# Patient Record
Sex: FEMALE | Race: OTHER | HISPANIC OR LATINO | URBAN - METROPOLITAN AREA
[De-identification: names, ages, dates, MRNs, and addresses within clinical notes are randomized per-mention and may not be internally consistent; named-entity substitution may affect disease eponyms.]

---

## 2023-08-16 ENCOUNTER — INPATIENT (INPATIENT)
Facility: HOSPITAL | Age: 53
LOS: 12 days | Discharge: HOME | DRG: 885 | End: 2023-08-29
Attending: PSYCHIATRY & NEUROLOGY | Admitting: PSYCHIATRY & NEUROLOGY
Payer: COMMERCIAL

## 2023-08-16 VITALS
OXYGEN SATURATION: 96 % | RESPIRATION RATE: 20 BRPM | TEMPERATURE: 99 F | HEART RATE: 100 BPM | SYSTOLIC BLOOD PRESSURE: 168 MMHG | WEIGHT: 184.97 LBS | HEIGHT: 66 IN | DIASTOLIC BLOOD PRESSURE: 84 MMHG

## 2023-08-16 DIAGNOSIS — F31.62 BIPOLAR DISORDER, CURRENT EPISODE MIXED, MODERATE: ICD-10-CM

## 2023-08-16 LAB
AMPHET UR-MCNC: NEGATIVE — SIGNIFICANT CHANGE UP
ANION GAP SERPL CALC-SCNC: 9 MMOL/L — SIGNIFICANT CHANGE UP (ref 5–17)
APAP SERPL-MCNC: <5 UG/ML — LOW (ref 10–30)
APPEARANCE UR: CLEAR — SIGNIFICANT CHANGE UP
BACTERIA # UR AUTO: PRESENT /HPF
BARBITURATES UR SCN-MCNC: NEGATIVE — SIGNIFICANT CHANGE UP
BASOPHILS # BLD AUTO: 0.04 K/UL — SIGNIFICANT CHANGE UP (ref 0–0.2)
BASOPHILS NFR BLD AUTO: 0.5 % — SIGNIFICANT CHANGE UP (ref 0–2)
BENZODIAZ UR-MCNC: NEGATIVE — SIGNIFICANT CHANGE UP
BILIRUB UR-MCNC: NEGATIVE — SIGNIFICANT CHANGE UP
BUN SERPL-MCNC: 11 MG/DL — SIGNIFICANT CHANGE UP (ref 7–23)
CALCIUM SERPL-MCNC: 9.7 MG/DL — SIGNIFICANT CHANGE UP (ref 8.4–10.5)
CHLORIDE SERPL-SCNC: 103 MMOL/L — SIGNIFICANT CHANGE UP (ref 96–108)
CO2 SERPL-SCNC: 27 MMOL/L — SIGNIFICANT CHANGE UP (ref 22–31)
COCAINE METAB.OTHER UR-MCNC: NEGATIVE — SIGNIFICANT CHANGE UP
COLOR SPEC: YELLOW — SIGNIFICANT CHANGE UP
COMMENT - URINE: SIGNIFICANT CHANGE UP
CREAT SERPL-MCNC: 0.82 MG/DL — SIGNIFICANT CHANGE UP (ref 0.5–1.3)
DIFF PNL FLD: NEGATIVE — SIGNIFICANT CHANGE UP
EGFR: 87 ML/MIN/1.73M2 — SIGNIFICANT CHANGE UP
EOSINOPHIL # BLD AUTO: 0 K/UL — SIGNIFICANT CHANGE UP (ref 0–0.5)
EOSINOPHIL NFR BLD AUTO: 0 % — SIGNIFICANT CHANGE UP (ref 0–6)
EPI CELLS # UR: SIGNIFICANT CHANGE UP /HPF (ref 0–5)
ETHANOL SERPL-MCNC: <10 MG/DL — SIGNIFICANT CHANGE UP (ref 0–10)
GLUCOSE SERPL-MCNC: 158 MG/DL — HIGH (ref 70–99)
GLUCOSE UR QL: NEGATIVE — SIGNIFICANT CHANGE UP
HCG SERPL-ACNC: 0 MIU/ML — SIGNIFICANT CHANGE UP
HCT VFR BLD CALC: 33.3 % — LOW (ref 34.5–45)
HGB BLD-MCNC: 10 G/DL — LOW (ref 11.5–15.5)
IMM GRANULOCYTES NFR BLD AUTO: 0.5 % — SIGNIFICANT CHANGE UP (ref 0–0.9)
KETONES UR-MCNC: ABNORMAL MG/DL
LEUKOCYTE ESTERASE UR-ACNC: NEGATIVE — SIGNIFICANT CHANGE UP
LYMPHOCYTES # BLD AUTO: 2.01 K/UL — SIGNIFICANT CHANGE UP (ref 1–3.3)
LYMPHOCYTES # BLD AUTO: 24 % — SIGNIFICANT CHANGE UP (ref 13–44)
MCHC RBC-ENTMCNC: 22.8 PG — LOW (ref 27–34)
MCHC RBC-ENTMCNC: 30 GM/DL — LOW (ref 32–36)
MCV RBC AUTO: 76 FL — LOW (ref 80–100)
METHADONE UR-MCNC: NEGATIVE — SIGNIFICANT CHANGE UP
MONOCYTES # BLD AUTO: 0.42 K/UL — SIGNIFICANT CHANGE UP (ref 0–0.9)
MONOCYTES NFR BLD AUTO: 5 % — SIGNIFICANT CHANGE UP (ref 2–14)
NEUTROPHILS # BLD AUTO: 5.87 K/UL — SIGNIFICANT CHANGE UP (ref 1.8–7.4)
NEUTROPHILS NFR BLD AUTO: 70 % — SIGNIFICANT CHANGE UP (ref 43–77)
NITRITE UR-MCNC: NEGATIVE — SIGNIFICANT CHANGE UP
NRBC # BLD: 0 /100 WBCS — SIGNIFICANT CHANGE UP (ref 0–0)
OPIATES UR-MCNC: NEGATIVE — SIGNIFICANT CHANGE UP
PCP SPEC-MCNC: SIGNIFICANT CHANGE UP
PCP UR-MCNC: NEGATIVE — SIGNIFICANT CHANGE UP
PH UR: 6 — SIGNIFICANT CHANGE UP (ref 5–8)
PLATELET # BLD AUTO: 297 K/UL — SIGNIFICANT CHANGE UP (ref 150–400)
POTASSIUM SERPL-MCNC: 3.7 MMOL/L — SIGNIFICANT CHANGE UP (ref 3.5–5.3)
POTASSIUM SERPL-SCNC: 3.7 MMOL/L — SIGNIFICANT CHANGE UP (ref 3.5–5.3)
PROT UR-MCNC: 30 MG/DL
RBC # BLD: 4.38 M/UL — SIGNIFICANT CHANGE UP (ref 3.8–5.2)
RBC # FLD: 15.3 % — HIGH (ref 10.3–14.5)
RBC CASTS # UR COMP ASSIST: < 5 /HPF — SIGNIFICANT CHANGE UP
SALICYLATES SERPL-MCNC: <0.3 MG/DL — LOW (ref 2.8–20)
SARS-COV-2 RNA SPEC QL NAA+PROBE: NEGATIVE — SIGNIFICANT CHANGE UP
SODIUM SERPL-SCNC: 139 MMOL/L — SIGNIFICANT CHANGE UP (ref 135–145)
SP GR SPEC: >=1.03 — SIGNIFICANT CHANGE UP (ref 1–1.03)
THC UR QL: NEGATIVE — SIGNIFICANT CHANGE UP
UROBILINOGEN FLD QL: 0.2 E.U./DL — SIGNIFICANT CHANGE UP
WBC # BLD: 8.38 K/UL — SIGNIFICANT CHANGE UP (ref 3.8–10.5)
WBC # FLD AUTO: 8.38 K/UL — SIGNIFICANT CHANGE UP (ref 3.8–10.5)
WBC UR QL: ABNORMAL /HPF

## 2023-08-16 PROCEDURE — 99285 EMERGENCY DEPT VISIT HI MDM: CPT

## 2023-08-16 PROCEDURE — 90792 PSYCH DIAG EVAL W/MED SRVCS: CPT | Mod: 95

## 2023-08-16 RX ORDER — TRAZODONE HCL 50 MG
50 TABLET ORAL AT BEDTIME
Refills: 0 | Status: DISCONTINUED | OUTPATIENT
Start: 2023-08-16 | End: 2023-08-29

## 2023-08-16 RX ORDER — OXCARBAZEPINE 300 MG/1
600 TABLET, FILM COATED ORAL ONCE
Refills: 0 | Status: COMPLETED | OUTPATIENT
Start: 2023-08-16 | End: 2023-08-16

## 2023-08-16 RX ORDER — METFORMIN HYDROCHLORIDE 850 MG/1
500 TABLET ORAL
Refills: 0 | Status: DISCONTINUED | OUTPATIENT
Start: 2023-08-16 | End: 2023-08-22

## 2023-08-16 RX ORDER — QUETIAPINE FUMARATE 200 MG/1
200 TABLET, FILM COATED ORAL
Refills: 0 | Status: DISCONTINUED | OUTPATIENT
Start: 2023-08-16 | End: 2023-08-23

## 2023-08-16 RX ORDER — QUETIAPINE FUMARATE 200 MG/1
400 TABLET, FILM COATED ORAL ONCE
Refills: 0 | Status: COMPLETED | OUTPATIENT
Start: 2023-08-16 | End: 2023-08-16

## 2023-08-16 RX ORDER — OXCARBAZEPINE 300 MG/1
300 TABLET, FILM COATED ORAL
Refills: 0 | Status: DISCONTINUED | OUTPATIENT
Start: 2023-08-16 | End: 2023-08-22

## 2023-08-16 RX ORDER — QUETIAPINE FUMARATE 200 MG/1
50 TABLET, FILM COATED ORAL EVERY 4 HOURS
Refills: 0 | Status: DISCONTINUED | OUTPATIENT
Start: 2023-08-16 | End: 2023-08-29

## 2023-08-16 RX ORDER — ACETAMINOPHEN 500 MG
650 TABLET ORAL EVERY 6 HOURS
Refills: 0 | Status: DISCONTINUED | OUTPATIENT
Start: 2023-08-16 | End: 2023-08-29

## 2023-08-16 RX ORDER — MAGNESIUM HYDROXIDE 400 MG/1
30 TABLET, CHEWABLE ORAL DAILY
Refills: 0 | Status: DISCONTINUED | OUTPATIENT
Start: 2023-08-16 | End: 2023-08-29

## 2023-08-16 RX ORDER — LANOLIN ALCOHOL/MO/W.PET/CERES
3 CREAM (GRAM) TOPICAL AT BEDTIME
Refills: 0 | Status: DISCONTINUED | OUTPATIENT
Start: 2023-08-16 | End: 2023-08-29

## 2023-08-16 RX ADMIN — QUETIAPINE FUMARATE 400 MILLIGRAM(S): 200 TABLET, FILM COATED ORAL at 07:30

## 2023-08-16 RX ADMIN — QUETIAPINE FUMARATE 200 MILLIGRAM(S): 200 TABLET, FILM COATED ORAL at 17:21

## 2023-08-16 RX ADMIN — METFORMIN HYDROCHLORIDE 500 MILLIGRAM(S): 850 TABLET ORAL at 22:59

## 2023-08-16 RX ADMIN — OXCARBAZEPINE 300 MILLIGRAM(S): 300 TABLET, FILM COATED ORAL at 22:58

## 2023-08-16 RX ADMIN — OXCARBAZEPINE 600 MILLIGRAM(S): 300 TABLET, FILM COATED ORAL at 07:29

## 2023-08-16 NOTE — BH PATIENT PROFILE - FALL HARM RISK - UNIVERSAL INTERVENTIONS
Bed in lowest position, wheels locked, appropriate side rails in place/Call bell, personal items and telephone in reach/Instruct patient to call for assistance before getting out of bed or chair/Non-slip footwear when patient is out of bed/[place] to call system/Physically safe environment - no spills, clutter or unnecessary equipment/Purposeful Proactive Rounding/Room/bathroom lighting operational, light cord in reach

## 2023-08-16 NOTE — ED PROVIDER NOTE - CLINICAL SUMMARY MEDICAL DECISION MAKING FREE TEXT BOX
52 F pmh dm, bipolar d/o p/w SI/HI and worsening depression x one month. has been intermittently compliant w/ meds.  will place on one to one, obtain psych clearance labs and c/s psych

## 2023-08-16 NOTE — ED ADULT NURSE NOTE - NSFALLUNIVINTERV_ED_ALL_ED
Bed/Stretcher in lowest position, wheels locked, appropriate side rails in place/Call bell, personal items and telephone in reach/Instruct patient to call for assistance before getting out of bed/chair/stretcher/Non-slip footwear applied when patient is off stretcher/[place] to call system/Physically safe environment - no spills, clutter or unnecessary equipment/Purposeful proactive rounding/Room/bathroom lighting operational, light cord in reach

## 2023-08-16 NOTE — ED ADULT NURSE NOTE - CAS EDN DISCHARGE ASSESSMENT
[General Appearance - Alert] : alert [General Appearance - In No Acute Distress] : in no acute distress [Sclera] : the sclera and conjunctiva were normal [PERRL With Normal Accommodation] : pupils were equal in size, round, and reactive to light [Extraocular Movements] : extraocular movements were intact [Outer Ear] : the ears and nose were normal in appearance [Oropharynx] : the oropharynx was normal [Auscultation Breath Sounds / Voice Sounds] : lungs were clear to auscultation bilaterally [Heart Rate And Rhythm] : heart rate was normal and rhythm regular [Heart Sounds] : normal S1 and S2 [Heart Sounds Gallop] : no gallops [Murmurs] : no murmurs [Heart Sounds Pericardial Friction Rub] : no pericardial rub [Bowel Sounds] : normal bowel sounds [Abdomen Soft] : soft [Abdomen Tenderness] : non-tender [] : no hepato-splenomegaly [Abdomen Mass (___ Cm)] : no abdominal mass palpated [No CVA Tenderness] : no ~M costovertebral angle tenderness [No Spinal Tenderness] : no spinal tenderness [Oriented To Time, Place, And Person] : oriented to person, place, and time [Impaired Insight] : insight and judgment were intact [Affect] : the affect was normal Alert and oriented to person, place and time

## 2023-08-16 NOTE — ED BEHAVIORAL HEALTH PROGRESS NOTE - ADDITIONAL COLLATERAL INFORMATION (NAME, PHONE, RELATIONSHIP):
As per outpatient psychiatrist at [facility] - the last time they saw her was in March 2023. She missed all appointments since then so they closed her case and discharged her as their patient on 7/23. They are not accepting any new intakes so they would not be able to see her at this time. Her psychiatrist stated that " bipolar disorder was previously well controlled and never had safety/SI/HI concerns with pt".

## 2023-08-16 NOTE — ED BEHAVIORAL HEALTH PROGRESS NOTE - CASE SUMMARY/FORMULATION (CLEARLY DOCUMENT RATIONALE FOR DISPOSITION CHANGE)
Pt is a 51 yo F, engaged to [employer], non-caregiver, street homeless for the last 5 months, unemployed, PMH significant for PCOS, DM, and HTN, with psych h/o Bipolar disorder, multiple psych admissions(>20), last admitted to [facility] from 6/29-7/27/20, no pSA/NSSIB, denies h/o aggression, denies active substance use, +legal hx(reports having been arrested twice for trespassing), in outpt care with Dr. Novak at [facility], on Quetiapine 400 mg BID and Trileptal 600 mg BID, takes meds with inconsistent adherence, who self presents to the ED for recent mood disturbance.    Pt endorses low mood with heightened irritability, active SI, recurrent urges to harm others, racing thoughts, and disturbed sleep for the last month in the setting of inconsistent med adherence. Recent trigger includes loss of housing and BF being psychiatrically hospitalized 2 days ago. She states she is interested in psychiatric admission to be restarted on her meds, which she feels she cannot safely do in an outpatient setting 2/2 SI, urges to harm others, and her inability to care for self. Given her numerous chronic and modifiable risk factors, the pt presents as an elevated risk of harm and is appropriate for voluntary admission.    Plan:  -Admit to inpatient psych 9.13  -Restart Seroquel 200 mg BID with plan to titrate to 400 mg bid and Trileptal 300 mg BID with plan to titrate to 600 mg bid  -For agitation, can offer Seroquel 50 mg q6 hrs PRN.  If given, obtain repeat ecg and hold antipsychotics if QTC>500  -Consider restarting Metformin 500 mg mg BID (consider titrating to home dose of 1000 mg bid). Check Hg A1c. Clarify with PMD at [facility] if pt needs to be on Amlodipine Besylate-Benazepril Hcl 5-10 mg daily, which she's been previously prescribed(as per psyckes)

## 2023-08-16 NOTE — ED ADULT NURSE NOTE - OBJECTIVE STATEMENT
Pt endorses history of bipolar disease. States she has not been taking her medications due to being homeless for the past 3 months. States she has the medications, but due to having to stay up during the night, she is unable to take the medication as it makes her sleepy. Endorse SI and HI with no plan for either. States when she was taking her medications regularly, she did not experience these symptoms. Pt endorses history of bipolar disease. States she has not been taking her medications due to being homeless for the past 1 month. States she has the medications, but due to having to stay up during the night, she is unable to take the medication as it makes her sleepy. Endorse SI and HI with no plan for either. States when she was taking her medications regularly, she did not experience these symptoms.

## 2023-08-16 NOTE — ED BEHAVIORAL HEALTH ASSESSMENT NOTE - DESCRIPTION
Previously lived with sister, until she was kicked out 5 months ago after she and her sister had an argument. Since then, she and her fiance have been staying in drop-in centers. See hpi See BH note

## 2023-08-16 NOTE — ED PROVIDER NOTE - PHYSICAL EXAMINATION
Vitals reviewed  Gen: comfortable appearing, calm/cooeprative, nad, speaking in full sentences  Skin: wwp, no rash/lesions  HEENT: ncat, eomi, mmm  CV: rrr, no audible m/r/g  Resp: symmetrical expansion, ctab, no w/r/r  Ext: FROM throughout, no peripheral edema  Neuro: alert/oriented x3, no focal deficits, steady gait

## 2023-08-16 NOTE — BH PATIENT PROFILE - FUNCTIONAL ASSESSMENT - DAILY ACTIVITY 4.
Problem: VTE, Risk for  Goal: # No s/s of VTE  Outcome: Outcome Met, Continue evaluating goal progress toward completion      4 = No assist / stand by assistance

## 2023-08-16 NOTE — BH PATIENT PROFILE - HOME MEDICATIONS
metFORMIN 500 mg oral tablet, extended release , 1 tab(s) orally 2 times a day (with meals)  OXcarbazepine 600 mg oral tablet, extended release , 1 tab(s) orally 2 times a day  sertraline 50 mg oral tablet , 1 tab(s) orally once a day  Zoloft 100 mg oral tablet , 1 tab(s) orally once a day  QUEtiapine 300 mg oral tablet , 1 tab(s) orally once a day (at bedtime)  QUEtiapine 100 mg oral tablet , 1 tab(s) orally once a day  clonazePAM 0.5 mg oral tablet , 1 tab(s) orally once a day, As Needed -for anxiety MDD:Max dose of 1 tab daily  metFORMIN 500 mg oral tablet, extended release ,  orally 2 times a day  SEROquel 300 mg oral tablet , 1 tab(s) orally 2 times a day  Trileptal 600 mg oral tablet , 1 tab(s) orally 2 times a day  KlonoPIN 0.5 mg oral tablet ,  orally once a day (at bedtime)  Zoloft 50 mg oral tablet , 1 tab(s) orally once a day

## 2023-08-16 NOTE — ED BEHAVIORAL HEALTH ASSESSMENT NOTE - HPI (INCLUDE ILLNESS QUALITY, SEVERITY, DURATION, TIMING, CONTEXT, MODIFYING FACTORS, ASSOCIATED SIGNS AND SYMPTOMS)
Pt is a 53 yo F, engaged to [facility], non-caregiver, street homeless for the last 5 months, unemployed, PMH significant for PCOS, DM, and HTN, with psych h/o Bipolar disorder, multiple psych admissions(>20), last admitted to [facility] from 6/29-7/27/20, no pSA/NSSIB, denies h/o aggression, denies active substance use, +legal hx(reports having been arrested twice for trespassing), in outpt care with Dr. Novak at [facility], on Quetiapine 400 mg BID and Trileptal 600 mg BID, takes meds with inconsistent adherence, who self presents to the ED for recent mood disturbance.    Pt reports taking her meds inconsistently over the last month and attributes her difficulty with adherence to being street homeless and a disturbed sleep cycle. In the setting of her inconsistent adherence, she reports experiencing low mood with heightened irritability, active SI, recurrent urges to harm others, and racing thoughts. However, she denies current or recent intent or plan to kill self/others, denies recent suicidal gestures or aggression toward others, and cites her fiance and her wish to have a future with him as her main protective factors. On ROS, pt denies current or recent A/VH or PI, denies recent substance use, and denies access to firearms. Pt states she is interested in psychiatric admission to be restarted on her meds, which she feels she cannot safely do in an outpatient setting 2/2 SI, urges to harm others, and her inability to care for self.    [facility] called pt's mother-in-law, who states she is unable to provide additional collateral and provided contact info for pt's fiance. [facility] left  for pt's fiance requesting he call back for collateral. Pt is a 51 yo F, engaged to [employer] [facility], non-caregiver, street homeless for the last 5 months, unemployed, PMH significant for PCOS, DM, and HTN, with psych h/o Bipolar disorder, multiple psych admissions(>20), last admitted to [facility] from 6/29-7/27/20, no pSA/NSSIB, denies h/o aggression, denies active substance use, +legal hx(reports having been arrested twice for trespassing), in outpt care with Dr. Novak at [facility], on Quetiapine 400 mg BID and Trileptal 600 mg BID, takes meds with inconsistent adherence, who self presents to the ED for recent mood disturbance.    Pt reports taking her meds inconsistently over the last month and attributes her difficulty with adherence to being street homeless and a disturbed sleep cycle. In the setting of her inconsistent adherence, she reports experiencing low mood with heightened irritability, active SI, recurrent urges to harm others, and racing thoughts. However, she denies current or recent intent or plan to kill self/others, denies recent suicidal gestures or aggression toward others, and cites her fiance and her wish to have a future with him as her main protective factors. On ROS, pt denies current or recent A/VH or PI, denies recent substance use, and denies access to firearms. Pt states she is interested in psychiatric admission to be restarted on her meds, which she feels she cannot safely do in an outpatient setting 2/2 SI, urges to harm others, and her inability to care for self.    [facility] called pt's mother-in-law, who states she is unable to provide additional collateral and provided contact info for pt's vikas. [facility] left  for pt's vikas requesting he call back for collateral.   On reevaluation at 10am, pt reports that vikas also has a history of bipolar disorder, was admitted to a psychiatric facility in [place] a few days ago. Patient no longer has any social support.    Reached Dr. Novak at [facility] - reports that patient was last seen at the [facility] in March 2023, failed to attend appointments or answer calls/letters since then. Clinic closed patient's case and discharged her in July, no longer considers her a patient and not taking new patients at this time, unable to schedule her for outpatient care. Provider endorses patient's long history of bipolar disorder with symptoms of irritability, reports that she was doing moderately well on quetiapine 400 mg BID and trileptal 600 mg BID when she was adherent to her medications.

## 2023-08-16 NOTE — ED PROVIDER NOTE - OBJECTIVE STATEMENT
52 F pmh dm, bipolar d/o p/w SI/HI and worsening depression x one month.  pt reports feeling like hurting herself and others but no attempts.  she has been homeless for past month and intermittently taking her meds; supposed to take trileptal and seroquel.  denies etoh/drug use.  mult psych admissions, last 2 yrs ago.  denies f/c, HA, dizziness, fainting, chest pain, sob, abd pain, fall./trauma

## 2023-08-16 NOTE — ED BEHAVIORAL HEALTH PROGRESS NOTE - SUMMARY
Pt is a 51 yo F, engaged to [employer], non-caregiver, street homeless for the last 5 months, unemployed, PMH significant for PCOS, DM, and HTN, with psych h/o Bipolar disorder, multiple psych admissions(>20), last admitted to [facility] from 6/29-7/27/20, no pSA/NSSIB, denies h/o aggression, denies active substance use, +legal hx(reports having been arrested twice for trespassing), in outpt care with Dr. Novak at [facility], on Quetiapine 400 mg BID and Trileptal 600 mg BID, takes meds with inconsistent adherence, who self presents to the ED for recent mood disturbance.    Pt endorses low mood with heightened irritability, active SI, recurrent urges to harm others, racing thoughts, and disturbed sleep for the last month in the setting of inconsistent med adherence. Recent trigger includes loss of housing and BF being psychiatrically hospitalized 2 days ago. She states she is interested in psychiatric admission to be restarted on her meds, which she feels she cannot safely do in an outpatient setting 2/2 SI, urges to harm others, and her inability to care for self. Given her numerous chronic and modifiable risk factors, the pt presents as an elevated risk of harm and is appropriate for voluntary admission.    Plan:  -Admit to inpatient psych 9.13  -Restart Seroquel 200 mg BID with plan to titrate to 400 mg bid and Trileptal 300 mg BID with plan to titrate to 600 mg bid  -For agitation, can offer Seroquel 50 mg q6 hrs PRN.  If given, obtain repeat ecg and hold antipsychotics if QTC>500  -Consider restarting Metformin 500 mg mg BID (consider titrating to home dose of 1000 mg bid). Check Hg A1c. Clarify with PMD at [facility] if pt needs to be on Amlodipine Besylate-Benazepril Hcl 5-10 mg daily, which she's been previously prescribed(as per psyckes)

## 2023-08-16 NOTE — ED BEHAVIORAL HEALTH ASSESSMENT NOTE - SUMMARY
Pt is a 51 yo F, engaged to [employer], non-caregiver, street homeless for the last 5 months, unemployed, PMH significant for PCOS, DM, and HTN, with psych h/o Bipolar disorder, multiple psych admissions(>20), last admitted to [facility] from 6/29-7/27/20, no pSA/NSSIB, denies h/o aggression, denies active substance use, +legal hx(reports having been arrested twice for trespassing), in outpt care with Dr. Novak at [facility], on Quetiapine 400 mg BID and Trileptal 600 mg BID, takes meds with inconsistent adherence, who self presents to the ED for recent mood disturbance.    Pt endorses low mood with heightened irritability, active SI, recurrent urges to harm others, racing thoughts, and disturbed sleep for the last month in the setting of inconsistent med adherence. She states she is interested in psychiatric admission to be restarted on her meds, which she feels she cannot safely do in an outpatient setting 2/2 SI, urges to harm others, and her inability to care for self. Given her numerous chronic and modifiable risk factors, the pt presents as an elevated risk of harm and is appropriate for voluntary admission.    Plan:  -Transfer to inpatient psych, pending bed availability  -Restart seroquel 400 mg BID and Trileptal 600 mg BID  -For agitation, can offer PO Haldol 5 mg q4H PRN. If refusing and is in acute risk of harm, can escalate to IM. If given, obtain repeat ecg and hold antipsychotics if QTC>500  -Consider restarting Metformin 1000 mg BID and Amlodipine Besylate-Benazepril Hcl 5-10 mg daily, which she's been previously prescribed(as per psyckes)

## 2023-08-16 NOTE — ED ADULT TRIAGE NOTE - CHIEF COMPLAINT QUOTE
"I feel like I wanna hurt others and hurt myself", "I feel very irritable". "I haven't taken my psych meds correctly for 1 mo. I don't feel well. I want to admit myself into psych".

## 2023-08-16 NOTE — ED BEHAVIORAL HEALTH PROGRESS NOTE - ADDITIONAL DETAILS/COMMENTS FREE TEXT
As per outpatient psychiatrist at [facility] - the last time they saw her was in March 2023. She missed all appointments since then so they closed her case and discharged her as their patient on 7/23. They are not accepting any new intakes so they would not be able to see her at this time. Her psychiatrist stated that " bipolar disorder was previously well controlled and never had safety/SI/HI concerns with pt".

## 2023-08-16 NOTE — ED BEHAVIORAL HEALTH NOTE - BEHAVIORAL HEALTH NOTE
===================     PRE-HOSPITAL COURSE     ===================     SOURCE: MD and secondhand ED documentation      DETAILS: Pt self-presented      ============     ED COURSE     ============     SOURCE: MD and secondhand ED documentation      ARRIVAL: BIB EMS     BELONGINGS: No belongings of note. All belongings were provided to hospital security, and patient currently in a hospital gown with a 1:1 staff member.     BEHAVIOR: Per MD, pt has been calm, cooperative and in behavioral control. MD reports pt is presenting due to worsening depression and SI/HI no plan or intent x1 month. MD reports pt is recently homeless and due to not having stable housing pt has been inconsistent with her medications. MD reports pt is AOx4, linear thought process, good eye contact and good grooming/hygiene.      TREATMENT: no medications given or required      VISITORS: No family or social supports at bedside

## 2023-08-16 NOTE — ED BEHAVIORAL HEALTH NOTE - BEHAVIORAL HEALTH NOTE
========================    FOR EACH COLLATERAL    ========================    NAME: Amanda Vazquez     NUMBER: [phone]    RELATIONSHIP: mother-in-law     RELIABILITY: poor    COMMENTS: [facility] attempted to obtain collateral. Collateral verbalized that she has no knowledge of pt's presentation recently. Collateral stated she does not have any information on the pt and to contact pt's fiance via , which is unreachable when attempted.

## 2023-08-16 NOTE — ED PROVIDER NOTE - PROGRESS NOTE DETAILS
telepsych recommend vol admit but no bed at this time and restarting seroquel/trileptal maranda: pt received at sign out as pending psych dispo, medically cleared by overnight team -- will admit

## 2023-08-17 PROCEDURE — 99223 1ST HOSP IP/OBS HIGH 75: CPT

## 2023-08-17 RX ORDER — BENZOCAINE AND MENTHOL 5; 1 G/100ML; G/100ML
1 LIQUID ORAL ONCE
Refills: 0 | Status: COMPLETED | OUTPATIENT
Start: 2023-08-17 | End: 2023-08-17

## 2023-08-17 RX ORDER — AMLODIPINE BESYLATE 2.5 MG/1
5 TABLET ORAL DAILY
Refills: 0 | Status: DISCONTINUED | OUTPATIENT
Start: 2023-08-17 | End: 2023-08-29

## 2023-08-17 RX ORDER — BENZOCAINE AND MENTHOL 5; 1 G/100ML; G/100ML
1 LIQUID ORAL EVERY 4 HOURS
Refills: 0 | Status: DISCONTINUED | OUTPATIENT
Start: 2023-08-17 | End: 2023-08-29

## 2023-08-17 RX ADMIN — METFORMIN HYDROCHLORIDE 500 MILLIGRAM(S): 850 TABLET ORAL at 22:30

## 2023-08-17 RX ADMIN — Medication 650 MILLIGRAM(S): at 17:26

## 2023-08-17 RX ADMIN — OXCARBAZEPINE 300 MILLIGRAM(S): 300 TABLET, FILM COATED ORAL at 10:28

## 2023-08-17 RX ADMIN — BENZOCAINE AND MENTHOL 1 LOZENGE: 5; 1 LIQUID ORAL at 19:09

## 2023-08-17 RX ADMIN — QUETIAPINE FUMARATE 200 MILLIGRAM(S): 200 TABLET, FILM COATED ORAL at 06:09

## 2023-08-17 RX ADMIN — OXCARBAZEPINE 300 MILLIGRAM(S): 300 TABLET, FILM COATED ORAL at 22:29

## 2023-08-17 RX ADMIN — QUETIAPINE FUMARATE 200 MILLIGRAM(S): 200 TABLET, FILM COATED ORAL at 18:00

## 2023-08-17 RX ADMIN — AMLODIPINE BESYLATE 5 MILLIGRAM(S): 2.5 TABLET ORAL at 14:09

## 2023-08-17 RX ADMIN — BENZOCAINE AND MENTHOL 1 LOZENGE: 5; 1 LIQUID ORAL at 22:33

## 2023-08-17 RX ADMIN — Medication 650 MILLIGRAM(S): at 18:26

## 2023-08-17 RX ADMIN — METFORMIN HYDROCHLORIDE 500 MILLIGRAM(S): 850 TABLET ORAL at 10:28

## 2023-08-17 NOTE — BH INPATIENT PSYCHIATRY ASSESSMENT NOTE - NSBHMSESPEECH_PSY_A_CORE
PATIENTS ONCOLOGY RECORD LOCATED IN [facility]      Subjective     Name:  NICHOLAS CRAMER     Date:  10/06/2017  Address:  [address] [place], [place] [id number]  Home: [phone]  :  1955 AGE:  62 y.o.        RECORDS OBTAINED:  Patients Oncology Record is located in [facility]   Normal volume, rate, productivity, spontaneity and articulation

## 2023-08-17 NOTE — BH INPATIENT PSYCHIATRY ASSESSMENT NOTE - NSBHASSESSSUMMFT_PSY_ALL_CORE
This is a 53y/o female unmarried, unemployed, undomiciled-street homeless for 5 months. PMH significant for PCOS, DM II, and HTN. Psych history significant for reported Bipolar disorder, multiple psych admissions(>20), last admitted to [facility] from 6/29-7/27/20. Previous hospitalization at [facility] in 2016. Patient denies hx of SA/NSSIB, denies h/o aggression, denies active substance use. +legal hx(reports having been arrested twice for trespassing). Reports that she is in outpt care with Dr. Novak at [facility] prescribed Quetiapine 400 mg BID and Trileptal 600 mg BID which she has been non-compliant with. Patient self presents to the ED with complaints of si/hi, worsening mood and irritability in context of medication non-compliance x1 month. Seeking voluntary admission. Patient transferred to [facility] voluntary status.    This is a 51y/o female unmarried, unemployed, undomiciled-street homeless for 5 months. PMH significant for PCOS, DM II, and HTN. Psych history significant for reported Bipolar disorder, multiple psych admissions(>20), last admitted to [facility] from 6/29-7/27/20. Previous hospitalization at [facility] in 2016. Patient denies hx of SA/NSSIB, denies h/o aggression, denies active substance use. +legal hx(reports having been arrested twice for trespassing). Reports that she is in outpt care with Dr. Novak at [facility] prescribed Quetiapine 400 mg BID and Trileptal 600 mg BID which she has been non-compliant with. Patient self presents to the ED with complaints of si/hi, worsening mood and irritability in context of medication non-compliance x1 month. Seeking voluntary admission. Patient transferred to [facility] voluntary status.     Metformin 500mg bid  Amlodipine 5mg qd  Trileptal 300mg bid  Seroquel 200mg bid

## 2023-08-17 NOTE — BH INPATIENT PSYCHIATRY ASSESSMENT NOTE - RISK ASSESSMENT
Static: mental illness, multiple prior hospitalizations  Modifiable: mood sxs, access to medications and treatment  Protective: help seeking, future oriented

## 2023-08-17 NOTE — BH INPATIENT PSYCHIATRY ASSESSMENT NOTE - NSBHMETABOLIC_PSY_ALL_CORE_FT
BMI: BMI (kg/m2): 29.9 (08-16-23 @ 03:39)  HbA1c:   Glucose:   BP: 166/78 (08-17-23 @ 14:00) (124/73 - 170/82)  Lipid Panel:  BMI: BMI (kg/m2): 29.9 (08-16-23 @ 03:39)  HbA1c:   Glucose:   BP: 142/92 (08-21-23 @ 09:08) (122/77 - 142/92)  Lipid Panel:

## 2023-08-17 NOTE — BH INPATIENT PSYCHIATRY ASSESSMENT NOTE - NSBHCHARTREVIEWVS_PSY_A_CORE FT
Vital Signs Last 24 Hrs  T(C): 36.6 (08-17-23 @ 14:00), Max: 36.7 (08-16-23 @ 18:26)  T(F): 97.8 (08-17-23 @ 14:00), Max: 98 (08-16-23 @ 18:26)  HR: 84 (08-17-23 @ 14:00) (82 - 84)  BP: 166/78 (08-17-23 @ 14:00) (166/78 - 170/82)  BP(mean): --  RR: 15 (08-17-23 @ 14:00) (15 - 18)  SpO2: 99% (08-17-23 @ 14:00) (94% - 99%)     Vital Signs Last 24 Hrs  T(C): 36.9 (08-21-23 @ 09:08), Max: 37.1 (08-20-23 @ 17:42)  T(F): 98.4 (08-21-23 @ 09:08), Max: 98.7 (08-20-23 @ 17:42)  HR: 85 (08-21-23 @ 09:08) (85 - 93)  BP: 142/92 (08-21-23 @ 09:08) (122/77 - 142/92)  BP(mean): --  RR: 18 (08-21-23 @ 09:08) (18 - 18)  SpO2: 96% (08-21-23 @ 09:08) (96% - 97%)

## 2023-08-17 NOTE — BH INPATIENT PSYCHIATRY ASSESSMENT NOTE - CURRENT MEDICATION
MEDICATIONS  (STANDING):  amLODIPine   Tablet 5 milliGRAM(s) Oral daily  metFORMIN 500 milliGRAM(s) Oral two times a day  OXcarbazepine 300 milliGRAM(s) Oral two times a day  QUEtiapine 200 milliGRAM(s) Oral two times a day    MEDICATIONS  (PRN):  acetaminophen     Tablet .. 650 milliGRAM(s) Oral every 6 hours PRN Temp greater or equal to 38C (100.4F), Mild Pain (1 - 3), Moderate Pain (4 - 6), Severe Pain (7 - 10)  aluminum hydroxide/magnesium hydroxide/simethicone Suspension 30 milliLiter(s) Oral every 4 hours PRN Dyspepsia  magnesium hydroxide Suspension 30 milliLiter(s) Oral daily PRN Constipation  melatonin 3 milliGRAM(s) Oral at bedtime PRN Insomnia  QUEtiapine 50 milliGRAM(s) Oral every 4 hours PRN agitation  traZODone 50 milliGRAM(s) Oral at bedtime PRN insomnia   MEDICATIONS  (STANDING):  amLODIPine   Tablet 5 milliGRAM(s) Oral daily  metFORMIN 500 milliGRAM(s) Oral two times a day  OXcarbazepine 300 milliGRAM(s) Oral two times a day  QUEtiapine 200 milliGRAM(s) Oral two times a day    MEDICATIONS  (PRN):  acetaminophen     Tablet .. 650 milliGRAM(s) Oral every 6 hours PRN Temp greater or equal to 38C (100.4F), Mild Pain (1 - 3), Moderate Pain (4 - 6), Severe Pain (7 - 10)  aluminum hydroxide/magnesium hydroxide/simethicone Suspension 30 milliLiter(s) Oral every 4 hours PRN Dyspepsia  benzocaine/menthol Lozenge 1 Lozenge Oral every 4 hours PRN sore throat  magnesium hydroxide Suspension 30 milliLiter(s) Oral daily PRN Constipation  melatonin 3 milliGRAM(s) Oral at bedtime PRN Insomnia  QUEtiapine 50 milliGRAM(s) Oral every 4 hours PRN agitation  traZODone 50 milliGRAM(s) Oral at bedtime PRN insomnia

## 2023-08-17 NOTE — BH INPATIENT PSYCHIATRY ASSESSMENT NOTE - NSBHATTESTAPPBILLTIME_PSY_A_CORE
I attest my time as RAPHAEL is greater than 50% of the total combined time spent on qualifying patient care activities. I have reviewed and verified the documentation.

## 2023-08-17 NOTE — BH INPATIENT PSYCHIATRY ASSESSMENT NOTE - HPI (INCLUDE ILLNESS QUALITY, SEVERITY, DURATION, TIMING, CONTEXT, MODIFYING FACTORS, ASSOCIATED SIGNS AND SYMPTOMS)
This is a 51y/o female unmarried, unemployed, undomiciled-street homeless for 5 months. PMH significant for PCOS, DM II, and HTN. Psych history significant for reported Bipolar disorder, multiple psych admissions(>20), last admitted to [facility] from 6/29-7/27/20. Previous hospitalization at [facility] in 2016. Patient denies hx of SA/NSSIB, denies h/o aggression, denies active substance use. +legal hx(reports having been arrested twice for trespassing). Reports that she is in outpt care with Dr. Novak at [facility] prescribed Quetiapine 400 mg BID and Trileptal 600 mg BID which she has been non-compliant with. Patient self presents to the ED with complaints of si/hi, worsening mood and irritability in context of medication non-compliance x1 month. Seeking voluntary admission. Patient transferred to [facility] voluntary status.     Patient states that she got into an argument with her sister 5 months ago. Her boyfriend of 14 years also got into an argument with his family at the same time and they have both been homeless together since then. She stopped taking her metformin and norvasc at that time as she did not follow up with her PCP. She states that she hasn't been taking her medications correctly due to at times not having the medications on her person or not wanting to sleep too deeply after taking seroquel. She states that she and her partner have been going to drop in shelters occasionally and have been wanting to a get a couple's room, however her partner doesn't have ID. They are hopeful that they will be able to get a domestic partnership certificate. She reports being very irritable, depressed and having racing thoughts. Additionally she had been having thoughts of 'wanting to hurt myself and others.' No plan or intent reported. No psychotic sxs, no avh or PI.     She gets her medications from her psychiatrist at [facility] and seems him every 2-3 months, however he provides refills for her monthly. Seroquel 400mg bid, trileptal 600mg bid, metformin 1000mg bid. Sleep and appetite overall unremarkable.   She does not give permission for treatment team to attain collateral. Also does not share that her partner has been admitted to another hospital for psych.    Per Ed report:  BTCM called pt's mother-in-law, who states she is unable to provide additional collateral and provided contact info for pt's vikas. [facility] left VM for pt's vikas requesting he call back for collateral.   On reevaluation at 10am, pt reports that vikas also has a history of bipolar disorder, was admitted to a psychiatric facility in [place] a few days ago. Patient no longer has any social support.  Reached Dr. Novak at [facility] - reports that patient was last seen at the [facility] in March 2023, failed to attend appointments or answer calls/letters since then. Clinic closed patient's case and discharged her in July, no longer considers her a patient and not taking new patients at this time, unable to schedule her for outpatient care. Provider endorses patient's long history of bipolar disorder with symptoms of irritability, reports that she was doing moderately well on quetiapine 400 mg BID and trileptal 600 mg BID when she was adherent to her medications.

## 2023-08-17 NOTE — BH INPATIENT PSYCHIATRY ASSESSMENT NOTE - DESCRIPTION
Previously lived with sister, until she was kicked out 5 months ago after she and her sister had an argument. Since then, she and her fiance have been staying in drop-in centers.

## 2023-08-18 PROCEDURE — 99233 SBSQ HOSP IP/OBS HIGH 50: CPT

## 2023-08-18 RX ADMIN — QUETIAPINE FUMARATE 200 MILLIGRAM(S): 200 TABLET, FILM COATED ORAL at 18:43

## 2023-08-18 RX ADMIN — OXCARBAZEPINE 300 MILLIGRAM(S): 300 TABLET, FILM COATED ORAL at 21:59

## 2023-08-18 RX ADMIN — BENZOCAINE AND MENTHOL 1 LOZENGE: 5; 1 LIQUID ORAL at 16:42

## 2023-08-18 RX ADMIN — QUETIAPINE FUMARATE 200 MILLIGRAM(S): 200 TABLET, FILM COATED ORAL at 06:52

## 2023-08-18 RX ADMIN — OXCARBAZEPINE 300 MILLIGRAM(S): 300 TABLET, FILM COATED ORAL at 11:13

## 2023-08-18 RX ADMIN — METFORMIN HYDROCHLORIDE 500 MILLIGRAM(S): 850 TABLET ORAL at 11:13

## 2023-08-18 RX ADMIN — BENZOCAINE AND MENTHOL 1 LOZENGE: 5; 1 LIQUID ORAL at 22:16

## 2023-08-18 RX ADMIN — AMLODIPINE BESYLATE 5 MILLIGRAM(S): 2.5 TABLET ORAL at 11:14

## 2023-08-18 RX ADMIN — METFORMIN HYDROCHLORIDE 500 MILLIGRAM(S): 850 TABLET ORAL at 21:59

## 2023-08-18 NOTE — BH SOCIAL WORK INITIAL PSYCHOSOCIAL EVALUATION - OTHER PAST PSYCHIATRIC HISTORY (INCLUDE DETAILS REGARDING ONSET, COURSE OF ILLNESS, INPATIENT/OUTPATIENT TREATMENT)
note in progress   PT declined to speak with writer x 3 as she was sleeping. Assessment completed via chart review.     As per chart review," This is a 53y/o female unmarried, unemployed, undomiciled-street homeless for 5 months. PMH significant for PCOS, DM II, and HTN. Psych history significant for reported Bipolar disorder, multiple psych admissions(>20), last admitted to [facility] from 6/29-7/27/20. Previous hospitalization at [facility] in 2016. Patient denies hx of SA/NSSIB, denies h/o aggression, denies active substance use. +legal hx(reports having been arrested twice for trespassing). Reports that she is in outpt care with Dr. Novak at [facility] prescribed Quetiapine 400 mg BID and Trileptal 600 mg BID which she has been non-compliant with. Patient self presents to the ED with complaints of si/hi, worsening mood and irritability in context of medication non-compliance x1 month. Seeking voluntary admission. Patient transferred to [facility] voluntary status."

## 2023-08-18 NOTE — BH INPATIENT PSYCHIATRY PROGRESS NOTE - NSBHASSESSSUMMFT_PSY_ALL_CORE
This is a 51y/o female unmarried, unemployed, undomiciled-street homeless for 5 months. PMH significant for PCOS, DM II, and HTN. Psych history significant for reported Bipolar disorder, multiple psych admissions(>20), last admitted to [facility] from 6/29-7/27/20. Previous hospitalization at [facility] in 2016. Patient denies hx of SA/NSSIB, denies h/o aggression, denies active substance use. +legal hx(reports having been arrested twice for trespassing). Reports that she is in outpt care with Dr. Novak at [facility] prescribed Quetiapine 400 mg BID and Trileptal 600 mg BID which she has been non-compliant with. Patient self presents to the ED with complaints of si/hi, worsening mood and irritability in context of medication non-compliance x1 month. Seeking voluntary admission. Patient transferred to [facility] voluntary status.    This is a 53y/o female unmarried, unemployed, undomiciled-street homeless for 5 months. PMH significant for PCOS, DM II, and HTN. Psych history significant for reported Bipolar disorder, multiple psych admissions(>20), last admitted to [facility] from 6/29-7/27/20. Previous hospitalization at [facility] in 2016. Patient denies hx of SA/NSSIB, denies h/o aggression, denies active substance use. +legal hx(reports having been arrested twice for trespassing). Reports that she is in outpt care with Dr. Novak at [facility] prescribed Quetiapine 400 mg BID and Trileptal 600 mg BID which she has been non-compliant with. Patient self presents to the ED with complaints of si/hi, worsening mood and irritability in context of medication non-compliance x1 month. Seeking voluntary admission. Patient transferred to [facility] voluntary status.     Seroquel 200mg bid  Trileptal 300mg bid  Norvasc 5mg qd

## 2023-08-18 NOTE — BH SOCIAL WORK INITIAL PSYCHOSOCIAL EVALUATION - NSPTSTATEDGOAL_PSY_ALL_CORE
----- Message from Herlinda Tello sent at 9/25/2017 12:52 PM CDT -----  Contact: Ellen  Pt ny would like birth control called in for the pt..[phone] (home)         .  [address] [place] - [address]  [address]  [place] [address]  Phone: [phone] Fax: [phone]      
S/w pt grandmother who is requesting that pcp call in birth control pills for the pt. Advised her that Dr Agosto does not prescribe birth control pills , pt will need to see a gyn for that. She verbalized understanding.  
Pt was not available to provide this information

## 2023-08-18 NOTE — BH INPATIENT PSYCHIATRY PROGRESS NOTE - NSBHCHARTREVIEWVS_PSY_A_CORE FT
Vital Signs Last 24 Hrs  T(C): 37.1 (08-18-23 @ 10:44), Max: 37.1 (08-18-23 @ 10:44)  T(F): 98.7 (08-18-23 @ 10:44), Max: 98.7 (08-18-23 @ 10:44)  HR: 74 (08-18-23 @ 10:44) (74 - 74)  BP: 128/80 (08-18-23 @ 10:44) (128/80 - 128/80)  BP(mean): --  RR: 16 (08-18-23 @ 10:44) (16 - 16)  SpO2: 100% (08-18-23 @ 10:44) (100% - 100%)     Vital Signs Last 24 Hrs  T(C): 36.9 (08-21-23 @ 09:08), Max: 37.1 (08-20-23 @ 17:42)  T(F): 98.4 (08-21-23 @ 09:08), Max: 98.7 (08-20-23 @ 17:42)  HR: 85 (08-21-23 @ 09:08) (85 - 93)  BP: 142/92 (08-21-23 @ 09:08) (122/77 - 142/92)  BP(mean): --  RR: 18 (08-21-23 @ 09:08) (18 - 18)  SpO2: 96% (08-21-23 @ 09:08) (96% - 96%)

## 2023-08-18 NOTE — BH INPATIENT PSYCHIATRY PROGRESS NOTE - CURRENT MEDICATION
MEDICATIONS  (STANDING):  amLODIPine   Tablet 5 milliGRAM(s) Oral daily  metFORMIN 500 milliGRAM(s) Oral two times a day  OXcarbazepine 300 milliGRAM(s) Oral two times a day  QUEtiapine 200 milliGRAM(s) Oral two times a day    MEDICATIONS  (PRN):  acetaminophen     Tablet .. 650 milliGRAM(s) Oral every 6 hours PRN Temp greater or equal to 38C (100.4F), Mild Pain (1 - 3), Moderate Pain (4 - 6), Severe Pain (7 - 10)  aluminum hydroxide/magnesium hydroxide/simethicone Suspension 30 milliLiter(s) Oral every 4 hours PRN Dyspepsia  benzocaine/menthol Lozenge 1 Lozenge Oral every 4 hours PRN sore throat  magnesium hydroxide Suspension 30 milliLiter(s) Oral daily PRN Constipation  melatonin 3 milliGRAM(s) Oral at bedtime PRN Insomnia  QUEtiapine 50 milliGRAM(s) Oral every 4 hours PRN agitation  traZODone 50 milliGRAM(s) Oral at bedtime PRN insomnia

## 2023-08-18 NOTE — BH INPATIENT PSYCHIATRY PROGRESS NOTE - NSBHMETABOLIC_PSY_ALL_CORE_FT
BMI: BMI (kg/m2): 29.9 (08-16-23 @ 03:39)  HbA1c:   Glucose:   BP: 128/80 (08-18-23 @ 10:44) (124/73 - 170/82)  Lipid Panel:  BMI: BMI (kg/m2): 29.9 (08-16-23 @ 03:39)  HbA1c:   Glucose:   BP: 142/92 (08-21-23 @ 09:08) (122/77 - 142/92)  Lipid Panel:

## 2023-08-18 NOTE — BH INPATIENT PSYCHIATRY PROGRESS NOTE - NSBHFUPINTERVALHXFT_PSY_A_CORE
Patient visible on the unit in the afternoon seen with watching tv with peers, pleasant and approach. She reports overall feeling the same, but is happy to be back on her medications. She is endorsing sxs of sedation, but sleep has overall been good. Fair appetite. No si/hi/avh or PI endorsed  No acute medical concerns. Patient visible on the unit in the afternoon seen with watching tv with peers, pleasant and approach. She reports overall feeling the same, but is happy to be back on her medications. She is endorsing sxs of sedation, but sleep has overall been good. Fair appetite. No si/hi/avh or PI endorsed. Is agreeable to labwork including a1c/lipid panel.  No acute medical concerns.

## 2023-08-18 NOTE — BH INPATIENT PSYCHIATRY PROGRESS NOTE - NSBHATTESTBILLING_PSY_A_CORE
99223-Initial OBS or IP - high complexity OR  mins 59305-Efqjdowtju OBS or IP - low complexity OR 25-34 mins

## 2023-08-19 PROCEDURE — 99232 SBSQ HOSP IP/OBS MODERATE 35: CPT

## 2023-08-19 RX ADMIN — METFORMIN HYDROCHLORIDE 500 MILLIGRAM(S): 850 TABLET ORAL at 11:25

## 2023-08-19 RX ADMIN — OXCARBAZEPINE 300 MILLIGRAM(S): 300 TABLET, FILM COATED ORAL at 22:03

## 2023-08-19 RX ADMIN — Medication 650 MILLIGRAM(S): at 12:00

## 2023-08-19 RX ADMIN — METFORMIN HYDROCHLORIDE 500 MILLIGRAM(S): 850 TABLET ORAL at 22:03

## 2023-08-19 RX ADMIN — OXCARBAZEPINE 300 MILLIGRAM(S): 300 TABLET, FILM COATED ORAL at 11:25

## 2023-08-19 RX ADMIN — QUETIAPINE FUMARATE 200 MILLIGRAM(S): 200 TABLET, FILM COATED ORAL at 18:32

## 2023-08-19 RX ADMIN — QUETIAPINE FUMARATE 200 MILLIGRAM(S): 200 TABLET, FILM COATED ORAL at 08:11

## 2023-08-19 RX ADMIN — AMLODIPINE BESYLATE 5 MILLIGRAM(S): 2.5 TABLET ORAL at 11:25

## 2023-08-19 RX ADMIN — BENZOCAINE AND MENTHOL 1 LOZENGE: 5; 1 LIQUID ORAL at 22:04

## 2023-08-19 RX ADMIN — Medication 650 MILLIGRAM(S): at 11:30

## 2023-08-19 RX ADMIN — BENZOCAINE AND MENTHOL 1 LOZENGE: 5; 1 LIQUID ORAL at 11:30

## 2023-08-19 NOTE — BH INPATIENT PSYCHIATRY PROGRESS NOTE - NSBHASSESSSUMMFT_PSY_ALL_CORE
This is a 51y/o female unmarried, unemployed, undomiciled-street homeless for 5 months. PMH significant for PCOS, DM II, and HTN. Psych history significant for reported Bipolar disorder, multiple psych admissions(>20), last admitted to [facility] from 6/29-7/27/20. Previous hospitalization at [facility] in 2016. Patient denies hx of SA/NSSIB, denies h/o aggression, denies active substance use. +legal hx(reports having been arrested twice for trespassing). Reports that she is in outpt care with Dr. Novak at [facility] prescribed Quetiapine 400 mg BID and Trileptal 600 mg BID which she has been non-compliant with. Patient self presents to the ED with complaints of si/hi, worsening mood and irritability in context of medication non-compliance x1 month. Seeking voluntary admission. Patient transferred to [facility] voluntary status.    - Admitted 9.13  - Continue quetiapine 200mg BID  - Continue oxcarbazepine 300mg BID  - Continue metformin 500mg bid  - Continue amlodipine 5mg daily  - quetiapine 50mg q4h PRN agitation  - trazodone 50mg qhs PRN insomnia

## 2023-08-19 NOTE — BH INPATIENT PSYCHIATRY PROGRESS NOTE - NSBHFUPINTERVALHXFT_PSY_A_CORE
Chart reviewed. Pt seen this AM in bed. Reports mood is "really tired". Reports fair appetite and sleep overnight. She continues to endorse SI and HI but reports they are "decreasing". Endorses no acute complaints at this time.

## 2023-08-19 NOTE — BH INPATIENT PSYCHIATRY PROGRESS NOTE - NSBHCHARTREVIEWVS_PSY_A_CORE FT
Vital Signs Last 24 Hrs  T(C): 36.7 (08-19-23 @ 09:16), Max: 36.7 (08-19-23 @ 09:16)  T(F): 98 (08-19-23 @ 09:16), Max: 98 (08-19-23 @ 09:16)  HR: 74 (08-19-23 @ 09:16) (74 - 74)  BP: 139/87 (08-19-23 @ 09:16) (139/87 - 139/87)  BP(mean): --  RR: 16 (08-19-23 @ 09:16) (16 - 16)  SpO2: 96% (08-19-23 @ 09:16) (96% - 96%)

## 2023-08-19 NOTE — BH INPATIENT PSYCHIATRY PROGRESS NOTE - NSBHMETABOLIC_PSY_ALL_CORE_FT
BMI: BMI (kg/m2): 29.9 (08-16-23 @ 03:39)  HbA1c:   Glucose:   BP: 139/87 (08-19-23 @ 09:16) (128/80 - 170/82)  Lipid Panel:

## 2023-08-20 PROCEDURE — 99231 SBSQ HOSP IP/OBS SF/LOW 25: CPT

## 2023-08-20 RX ADMIN — BENZOCAINE AND MENTHOL 1 LOZENGE: 5; 1 LIQUID ORAL at 21:55

## 2023-08-20 RX ADMIN — OXCARBAZEPINE 300 MILLIGRAM(S): 300 TABLET, FILM COATED ORAL at 21:53

## 2023-08-20 RX ADMIN — OXCARBAZEPINE 300 MILLIGRAM(S): 300 TABLET, FILM COATED ORAL at 10:46

## 2023-08-20 RX ADMIN — METFORMIN HYDROCHLORIDE 500 MILLIGRAM(S): 850 TABLET ORAL at 21:53

## 2023-08-20 RX ADMIN — QUETIAPINE FUMARATE 200 MILLIGRAM(S): 200 TABLET, FILM COATED ORAL at 10:46

## 2023-08-20 RX ADMIN — AMLODIPINE BESYLATE 5 MILLIGRAM(S): 2.5 TABLET ORAL at 10:47

## 2023-08-20 RX ADMIN — METFORMIN HYDROCHLORIDE 500 MILLIGRAM(S): 850 TABLET ORAL at 10:46

## 2023-08-20 RX ADMIN — Medication 650 MILLIGRAM(S): at 23:45

## 2023-08-20 RX ADMIN — Medication 650 MILLIGRAM(S): at 21:55

## 2023-08-20 RX ADMIN — QUETIAPINE FUMARATE 200 MILLIGRAM(S): 200 TABLET, FILM COATED ORAL at 21:54

## 2023-08-20 NOTE — BH INPATIENT PSYCHIATRY PROGRESS NOTE - NSBHMETABOLIC_PSY_ALL_CORE_FT
BMI: BMI (kg/m2): 29.9 (08-16-23 @ 03:39)  HbA1c:   Glucose:   BP: 122/77 (08-20-23 @ 17:42) (122/77 - 139/87)  Lipid Panel:

## 2023-08-20 NOTE — BH INPATIENT PSYCHIATRY PROGRESS NOTE - NSBHCHARTREVIEWVS_PSY_A_CORE FT
Vital Signs Last 24 Hrs  T(C): 37.1 (08-20-23 @ 17:42), Max: 37.1 (08-20-23 @ 17:42)  T(F): 98.7 (08-20-23 @ 17:42), Max: 98.7 (08-20-23 @ 17:42)  HR: 93 (08-20-23 @ 17:42) (86 - 93)  BP: 122/77 (08-20-23 @ 17:42) (122/77 - 129/79)  BP(mean): --  RR: 18 (08-20-23 @ 17:42) (18 - 18)  SpO2: 96% (08-20-23 @ 17:42) (96% - 97%)

## 2023-08-20 NOTE — BH INPATIENT PSYCHIATRY PROGRESS NOTE - NSBHFUPINTERVALHXFT_PSY_A_CORE
No acute events overnight. The patient reported that she has been sleeping more during the day. She reports this is likely due to her "getting used to my medication again." She stated that due to being homeless she was off medication for about a month. She reports improved appetite, a slightly improved mood, and similar irritability. She denies SI or HI.

## 2023-08-20 NOTE — BH INPATIENT PSYCHIATRY PROGRESS NOTE - NSBHASSESSSUMMFT_PSY_ALL_CORE
This is a 51y/o female unmarried, unemployed, undomiciled-street homeless for 5 months. PMH significant for PCOS, DM II, and HTN. Psych history significant for reported Bipolar disorder, multiple psych admissions(>20), last admitted to [facility] from 6/29-7/27/20. Previous hospitalization at [facility] in 2016. Patient denies hx of SA/NSSIB, denies h/o aggression, denies active substance use. +legal hx(reports having been arrested twice for trespassing). Reports that she is in outpt care with Dr. Novak at [facility] prescribed Quetiapine 400 mg BID and Trileptal 600 mg BID which she has been non-compliant with. Patient self presents to the ED with complaints of si/hi, worsening mood and irritability in context of medication non-compliance x1 month. Seeking voluntary admission. Patient transferred to [facility] voluntary status.    On assessment today, the patient reports sedation on current doses of medications but wants to keep them as is for now. She reports she eventually wants to be back to quetiapine 400 mg BID and trileptal 600 mg BID due to stability on these doses. She reports some irritability, steadily improving mood, and no other acute complaints. She denies SI today.     - Admitted 9.13  - Continue quetiapine 200mg BID  - Continue oxcarbazepine 300mg BID  - Continue metformin 500mg bid  - Continue amlodipine 5mg daily  - quetiapine 50mg q4h PRN agitation  - trazodone 50mg qhs PRN insomnia

## 2023-08-21 PROCEDURE — 99231 SBSQ HOSP IP/OBS SF/LOW 25: CPT

## 2023-08-21 RX ADMIN — AMLODIPINE BESYLATE 5 MILLIGRAM(S): 2.5 TABLET ORAL at 11:39

## 2023-08-21 RX ADMIN — OXCARBAZEPINE 300 MILLIGRAM(S): 300 TABLET, FILM COATED ORAL at 11:39

## 2023-08-21 RX ADMIN — BENZOCAINE AND MENTHOL 1 LOZENGE: 5; 1 LIQUID ORAL at 11:42

## 2023-08-21 RX ADMIN — Medication 650 MILLIGRAM(S): at 22:50

## 2023-08-21 RX ADMIN — METFORMIN HYDROCHLORIDE 500 MILLIGRAM(S): 850 TABLET ORAL at 22:20

## 2023-08-21 RX ADMIN — BENZOCAINE AND MENTHOL 1 LOZENGE: 5; 1 LIQUID ORAL at 22:20

## 2023-08-21 RX ADMIN — OXCARBAZEPINE 300 MILLIGRAM(S): 300 TABLET, FILM COATED ORAL at 22:20

## 2023-08-21 RX ADMIN — QUETIAPINE FUMARATE 200 MILLIGRAM(S): 200 TABLET, FILM COATED ORAL at 11:39

## 2023-08-21 RX ADMIN — METFORMIN HYDROCHLORIDE 500 MILLIGRAM(S): 850 TABLET ORAL at 11:40

## 2023-08-21 RX ADMIN — Medication 650 MILLIGRAM(S): at 22:20

## 2023-08-21 RX ADMIN — QUETIAPINE FUMARATE 200 MILLIGRAM(S): 200 TABLET, FILM COATED ORAL at 22:20

## 2023-08-21 NOTE — BH TREATMENT PLAN - NSTXSUICIDINTERRN_PSY_ALL_CORE
Provide a safe environment to the patient at all times. Present opportunities for the patient to express thoughts and feelings in a nonjudgmental environment.

## 2023-08-21 NOTE — BH INPATIENT PSYCHIATRY PROGRESS NOTE - NSBHASSESSSUMMFT_PSY_ALL_CORE
This is a 51y/o female unmarried, unemployed, undomiciled-street homeless for 5 months. PMH significant for PCOS, DM II, and HTN. Psych history significant for reported Bipolar disorder, multiple psych admissions(>20), last admitted to [facility] from 6/29-7/27/20. Previous hospitalization at [facility] in 2016. Patient denies hx of SA/NSSIB, denies h/o aggression, denies active substance use. +legal hx(reports having been arrested twice for trespassing). Reports that she is in outpt care with Dr. Novak at [facility] prescribed Quetiapine 400 mg BID and Trileptal 600 mg BID which she has been non-compliant with. Patient self presents to the ED with complaints of si/hi, worsening mood and irritability in context of medication non-compliance x1 month. Seeking voluntary admission. Patient transferred to [facility] voluntary status.    On assessment today, the patient reports sedation on current doses of medications but wants to keep them as is for now. She reports she eventually wants to be back to quetiapine 400 mg BID and trileptal 600 mg BID due to stability on these doses. She reports some irritability, steadily improving mood, and no other acute complaints. She denies SI today.     - Admitted 9.13  - Continue quetiapine 200mg BID  - Continue oxcarbazepine 300mg BID  - Continue metformin 500mg bid  - Continue amlodipine 5mg daily  - quetiapine 50mg q4h PRN agitation  - trazodone 50mg qhs PRN insomnia

## 2023-08-21 NOTE — BH INPATIENT PSYCHIATRY PROGRESS NOTE - NSBHCHARTREVIEWVS_PSY_A_CORE FT
Vital Signs Last 24 Hrs  T(C): 36.9 (08-21-23 @ 09:08), Max: 37.1 (08-20-23 @ 17:42)  T(F): 98.4 (08-21-23 @ 09:08), Max: 98.7 (08-20-23 @ 17:42)  HR: 85 (08-21-23 @ 09:08) (85 - 93)  BP: 142/92 (08-21-23 @ 09:08) (122/77 - 142/92)  BP(mean): --  RR: 18 (08-21-23 @ 09:08) (18 - 18)  SpO2: 96% (08-21-23 @ 09:08) (96% - 96%)

## 2023-08-21 NOTE — BH INPATIENT PSYCHIATRY PROGRESS NOTE - NSBHMETABOLIC_PSY_ALL_CORE_FT
BMI: BMI (kg/m2): 29.9 (08-16-23 @ 03:39)  HbA1c:   Glucose:   BP: 142/92 (08-21-23 @ 09:08) (122/77 - 142/92)  Lipid Panel:

## 2023-08-21 NOTE — BH TREATMENT PLAN - NSTXMEDICINTERRN_PSY_ALL_CORE
Promote medication adherence by discussing medications with the patient and clear up any misconceptions. Educate the patient on the importance of taking medications as prescribed.

## 2023-08-21 NOTE — BH INPATIENT PSYCHIATRY PROGRESS NOTE - NSBHFUPINTERVALHXFT_PSY_A_CORE
Patient seen in her room today along with SW. On approach is resting in bed, but cooperative with interaction and questions asked of her. She reports that her 'mood is better and I'm a little less irritable' She also states that today she has a stomachache and attributes it to getting her meals which have been cold. She discusses at length her dissatisfaction with getting cold meals and states that she was told by staff to speak with writer about this..She also shares that she started to go to groups yesterday and is intending on going to one later today. She has been in contact with her fiance who she reports is in a hospital in [place] and they are coordinating their discharge so that they can both get into a couples shelter.   No si/hi/avh or PI endorsed. Tolerating meds well without issue. Pt aware that cbc/lipid panel/a1c to be ordered for am.  [facility] pharmacy on [address] [phone] called and meds verified: Metformin 1000mg bid, seroquel 400mg bid, norvasc 5mg qd, trileptal 600mg bid.

## 2023-08-21 NOTE — BH TREATMENT PLAN - NSTXDEPRESINTERRN_PSY_ALL_CORE
Encourage patient to attend groups. Encourage patient to adhere to medication. Administer medication. Establish therapeutic relationship allowing patient to express feelings and concerns.

## 2023-08-22 LAB
A1C WITH ESTIMATED AVERAGE GLUCOSE RESULT: 9.7 % — HIGH (ref 4–5.6)
CHOLEST SERPL-MCNC: 179 MG/DL — SIGNIFICANT CHANGE UP
ESTIMATED AVERAGE GLUCOSE: 232 MG/DL — HIGH (ref 68–114)
GLUCOSE BLDC GLUCOMTR-MCNC: 201 MG/DL — HIGH (ref 70–99)
GLUCOSE BLDC GLUCOMTR-MCNC: 222 MG/DL — HIGH (ref 70–99)
HCT VFR BLD CALC: 35.4 % — SIGNIFICANT CHANGE UP (ref 34.5–45)
HDLC SERPL-MCNC: 45 MG/DL — LOW
HGB BLD-MCNC: 10.6 G/DL — LOW (ref 11.5–15.5)
LIPID PNL WITH DIRECT LDL SERPL: 80 MG/DL — SIGNIFICANT CHANGE UP
MCHC RBC-ENTMCNC: 22.7 PG — LOW (ref 27–34)
MCHC RBC-ENTMCNC: 29.9 GM/DL — LOW (ref 32–36)
MCV RBC AUTO: 75.8 FL — LOW (ref 80–100)
NON HDL CHOLESTEROL: 134 MG/DL — HIGH
NRBC # BLD: 0 /100 WBCS — SIGNIFICANT CHANGE UP (ref 0–0)
PLATELET # BLD AUTO: 350 K/UL — SIGNIFICANT CHANGE UP (ref 150–400)
RBC # BLD: 4.67 M/UL — SIGNIFICANT CHANGE UP (ref 3.8–5.2)
RBC # FLD: 15.5 % — HIGH (ref 10.3–14.5)
TRIGL SERPL-MCNC: 270 MG/DL — HIGH
WBC # BLD: 7.95 K/UL — SIGNIFICANT CHANGE UP (ref 3.8–10.5)
WBC # FLD AUTO: 7.95 K/UL — SIGNIFICANT CHANGE UP (ref 3.8–10.5)

## 2023-08-22 PROCEDURE — 99232 SBSQ HOSP IP/OBS MODERATE 35: CPT

## 2023-08-22 RX ORDER — GLUCAGON INJECTION, SOLUTION 0.5 MG/.1ML
1 INJECTION, SOLUTION SUBCUTANEOUS ONCE
Refills: 0 | Status: DISCONTINUED | OUTPATIENT
Start: 2023-08-22 | End: 2023-08-29

## 2023-08-22 RX ORDER — INSULIN LISPRO 100/ML
VIAL (ML) SUBCUTANEOUS
Refills: 0 | Status: DISCONTINUED | OUTPATIENT
Start: 2023-08-22 | End: 2023-08-29

## 2023-08-22 RX ORDER — DEXTROSE 50 % IN WATER 50 %
15 SYRINGE (ML) INTRAVENOUS ONCE
Refills: 0 | Status: DISCONTINUED | OUTPATIENT
Start: 2023-08-22 | End: 2023-08-29

## 2023-08-22 RX ORDER — METFORMIN HYDROCHLORIDE 850 MG/1
1000 TABLET ORAL
Refills: 0 | Status: DISCONTINUED | OUTPATIENT
Start: 2023-08-22 | End: 2023-08-29

## 2023-08-22 RX ORDER — OXCARBAZEPINE 300 MG/1
600 TABLET, FILM COATED ORAL
Refills: 0 | Status: DISCONTINUED | OUTPATIENT
Start: 2023-08-22 | End: 2023-08-29

## 2023-08-22 RX ADMIN — METFORMIN HYDROCHLORIDE 1000 MILLIGRAM(S): 850 TABLET ORAL at 23:30

## 2023-08-22 RX ADMIN — QUETIAPINE FUMARATE 200 MILLIGRAM(S): 200 TABLET, FILM COATED ORAL at 17:32

## 2023-08-22 RX ADMIN — OXCARBAZEPINE 300 MILLIGRAM(S): 300 TABLET, FILM COATED ORAL at 10:28

## 2023-08-22 RX ADMIN — OXCARBAZEPINE 600 MILLIGRAM(S): 300 TABLET, FILM COATED ORAL at 23:29

## 2023-08-22 RX ADMIN — METFORMIN HYDROCHLORIDE 1000 MILLIGRAM(S): 850 TABLET ORAL at 10:28

## 2023-08-22 RX ADMIN — BENZOCAINE AND MENTHOL 1 LOZENGE: 5; 1 LIQUID ORAL at 10:29

## 2023-08-22 RX ADMIN — AMLODIPINE BESYLATE 5 MILLIGRAM(S): 2.5 TABLET ORAL at 10:29

## 2023-08-22 RX ADMIN — QUETIAPINE FUMARATE 200 MILLIGRAM(S): 200 TABLET, FILM COATED ORAL at 06:09

## 2023-08-22 NOTE — BH INPATIENT PSYCHIATRY PROGRESS NOTE - CURRENT MEDICATION
MEDICATIONS  (STANDING):  amLODIPine   Tablet 5 milliGRAM(s) Oral daily  glucagon  Injectable 1 milliGRAM(s) IntraMuscular once  insulin lispro (ADMELOG) corrective regimen sliding scale   SubCutaneous three times a day before meals  metFORMIN 1000 milliGRAM(s) Oral two times a day  OXcarbazepine 300 milliGRAM(s) Oral two times a day  QUEtiapine 200 milliGRAM(s) Oral two times a day    MEDICATIONS  (PRN):  acetaminophen     Tablet .. 650 milliGRAM(s) Oral every 6 hours PRN Temp greater or equal to 38C (100.4F), Mild Pain (1 - 3), Moderate Pain (4 - 6), Severe Pain (7 - 10)  aluminum hydroxide/magnesium hydroxide/simethicone Suspension 30 milliLiter(s) Oral every 4 hours PRN Dyspepsia  benzocaine/menthol Lozenge 1 Lozenge Oral every 4 hours PRN sore throat  dextrose Oral Gel 15 Gram(s) Oral once PRN Blood Glucose LESS THAN 70 milliGRAM(s)/deciliter  magnesium hydroxide Suspension 30 milliLiter(s) Oral daily PRN Constipation  melatonin 3 milliGRAM(s) Oral at bedtime PRN Insomnia  QUEtiapine 50 milliGRAM(s) Oral every 4 hours PRN agitation  traZODone 50 milliGRAM(s) Oral at bedtime PRN insomnia

## 2023-08-22 NOTE — BH INPATIENT PSYCHIATRY PROGRESS NOTE - NSBHCHARTREVIEWVS_PSY_A_CORE FT
Vital Signs Last 24 Hrs  T(C): 36.5 (08-22-23 @ 09:26), Max: 36.5 (08-22-23 @ 09:26)  T(F): 97.7 (08-22-23 @ 09:26), Max: 97.7 (08-22-23 @ 09:26)  HR: 101 (08-22-23 @ 09:26) (101 - 101)  BP: 141/87 (08-22-23 @ 09:26) (141/87 - 141/87)  BP(mean): --  RR: 18 (08-22-23 @ 09:26) (18 - 18)  SpO2: 96% (08-22-23 @ 09:26) (96% - 96%)

## 2023-08-22 NOTE — BH INPATIENT PSYCHIATRY PROGRESS NOTE - NSBHMETABOLIC_PSY_ALL_CORE_FT
BMI: BMI (kg/m2): 29.9 (08-16-23 @ 03:39)  HbA1c: A1C with Estimated Average Glucose Result: 9.7 % (08-22-23 @ 05:30)    Glucose: POCT Blood Glucose.: 201 mg/dL (08-22-23 @ 12:21)    BP: 141/87 (08-22-23 @ 09:26) (122/77 - 142/92)  Lipid Panel: Date/Time: 08-22-23 @ 05:30  Cholesterol, Serum: 179  Direct LDL: --  HDL Cholesterol, Serum: 45  Total Cholesterol/HDL Ration Measurement: --  Triglycerides, Serum: 270

## 2023-08-22 NOTE — BH INPATIENT PSYCHIATRY PROGRESS NOTE - NSBHASSESSSUMMFT_PSY_ALL_CORE
83 This is a 53y/o female unmarried, unemployed, undomiciled-street homeless for 5 months. PMH significant for PCOS, DM II, and HTN. Psych history significant for reported Bipolar disorder, multiple psych admissions(>20), last admitted to [facility] from 6/29-7/27/20. Previous hospitalization at [facility] in 2016. Patient denies hx of SA/NSSIB, denies h/o aggression, denies active substance use. +legal hx(reports having been arrested twice for trespassing). Reports that she is in outpt care with Dr. Novak at [facility] prescribed Quetiapine 400 mg BID and Trileptal 600 mg BID which she has been non-compliant with. Patient self presents to the ED with complaints of si/hi, worsening mood and irritability in context of medication non-compliance x1 month. Seeking voluntary admission. Patient transferred to [facility] voluntary status.    She reports she eventually wants to be back to quetiapine 400 mg BID and trileptal 600 mg BID due to stability on these doses. She reports some irritability, steadily improving mood, and no other acute complaints. She denies SI today.     - Admitted 9.13  - Continue quetiapine 200mg BID  - Increase oxcarbazepine from 300mg BID to 600mg bid  - Increase metformin from 500mg bid to 1000mg bid  - Diabetic diet  - Insulin sliding scale  - Will consult with endocrinology  - Continue amlodipine 5mg daily  - quetiapine 50mg q4h PRN agitation  - trazodone 50mg qhs PRN insomnia

## 2023-08-22 NOTE — BH INPATIENT PSYCHIATRY PROGRESS NOTE - NSBHFUPINTERVALHXFT_PSY_A_CORE
Patient mostly in her room today, is seen later in the afternoon in the milieu. Recent lab results were discussed with patient including a1c of 9.7, increase in metformin to 1000mg bid, diabetic food order, finger sticks pre-meal and qhs with insulin sliding scale. Medication education provided regarding seroquel's effect on blood sugar- which she reported being unaware of. She quickly states that she wants to remain on seroquel and home dose of her medications despite discussion of alternative options to stabilize mood. Fair sleep and appetite reported.   Overall noted to be superficial, slight irritability noted. Will plan to increase trileptal to 600mg bid  She reports overall improvement, no si/hi/avh or PI endorsed. Anxious to speak to writer to discuss discharge and aftercare planning

## 2023-08-23 LAB
GLUCOSE BLDC GLUCOMTR-MCNC: 171 MG/DL — HIGH (ref 70–99)
GLUCOSE BLDC GLUCOMTR-MCNC: 190 MG/DL — HIGH (ref 70–99)
GLUCOSE BLDC GLUCOMTR-MCNC: 209 MG/DL — HIGH (ref 70–99)
GLUCOSE BLDC GLUCOMTR-MCNC: 268 MG/DL — HIGH (ref 70–99)

## 2023-08-23 PROCEDURE — 99231 SBSQ HOSP IP/OBS SF/LOW 25: CPT

## 2023-08-23 RX ORDER — QUETIAPINE FUMARATE 200 MG/1
200 TABLET, FILM COATED ORAL DAILY
Refills: 0 | Status: DISCONTINUED | OUTPATIENT
Start: 2023-08-23 | End: 2023-08-25

## 2023-08-23 RX ORDER — QUETIAPINE FUMARATE 200 MG/1
300 TABLET, FILM COATED ORAL AT BEDTIME
Refills: 0 | Status: DISCONTINUED | OUTPATIENT
Start: 2023-08-23 | End: 2023-08-24

## 2023-08-23 RX ADMIN — OXCARBAZEPINE 600 MILLIGRAM(S): 300 TABLET, FILM COATED ORAL at 11:30

## 2023-08-23 RX ADMIN — OXCARBAZEPINE 600 MILLIGRAM(S): 300 TABLET, FILM COATED ORAL at 22:06

## 2023-08-23 RX ADMIN — METFORMIN HYDROCHLORIDE 1000 MILLIGRAM(S): 850 TABLET ORAL at 11:30

## 2023-08-23 RX ADMIN — METFORMIN HYDROCHLORIDE 1000 MILLIGRAM(S): 850 TABLET ORAL at 23:26

## 2023-08-23 RX ADMIN — QUETIAPINE FUMARATE 200 MILLIGRAM(S): 200 TABLET, FILM COATED ORAL at 07:08

## 2023-08-23 RX ADMIN — QUETIAPINE FUMARATE 300 MILLIGRAM(S): 200 TABLET, FILM COATED ORAL at 22:07

## 2023-08-23 RX ADMIN — AMLODIPINE BESYLATE 5 MILLIGRAM(S): 2.5 TABLET ORAL at 11:30

## 2023-08-23 NOTE — BH INPATIENT PSYCHIATRY PROGRESS NOTE - NSBHASSESSSUMMFT_PSY_ALL_CORE
This is a 51y/o female unmarried, unemployed, undomiciled-street homeless for 5 months. PMH significant for PCOS, DM II, and HTN. Psych history significant for reported Bipolar disorder, multiple psych admissions(>20), last admitted to [facility] from 6/29-7/27/20. Previous hospitalization at [facility] in 2016. Patient denies hx of SA/NSSIB, denies h/o aggression, denies active substance use. +legal hx(reports having been arrested twice for trespassing). Reports that she is in outpt care with Dr. Novak at [facility] prescribed Quetiapine 400 mg BID and Trileptal 600 mg BID which she has been non-compliant with. Patient self presents to the ED with complaints of si/hi, worsening mood and irritability in context of medication non-compliance x1 month. Seeking voluntary admission. Patient transferred to [facility] voluntary status.    She reports she eventually wants to be back to quetiapine 400 mg BID and trileptal 600 mg BID due to stability on these doses. She reports some irritability, steadily improving mood, and no other acute complaints. She denies SI today.     - Admitted 9.13  -  quetiapine 200mg qd and 300mg qhs   -  metformin 1000mg bid  - Diabetic diet  - Insulin sliding scale  - Will consult with endocrinology  - Continue amlodipine 5mg daily  - quetiapine 50mg q4h PRN agitation  - trazodone 50mg qhs PRN insomnia

## 2023-08-23 NOTE — BH INPATIENT PSYCHIATRY PROGRESS NOTE - NSBHCHARTREVIEWVS_PSY_A_CORE FT
Vital Signs Last 24 Hrs  T(C): 37.2 (08-23-23 @ 09:44), Max: 37.6 (08-22-23 @ 17:40)  T(F): 98.9 (08-23-23 @ 09:44), Max: 99.7 (08-22-23 @ 17:40)  HR: 110 (08-23-23 @ 09:44) (103 - 110)  BP: 123/74 (08-23-23 @ 09:44) (123/74 - 148/94)  BP(mean): --  RR: 18 (08-23-23 @ 09:44) (18 - 18)  SpO2: 95% (08-23-23 @ 09:44) (95% - 97%)     Vital Signs Last 24 Hrs  T(C): 37.3 (08-24-23 @ 08:34), Max: 37.6 (08-23-23 @ 17:15)  T(F): 99.2 (08-24-23 @ 08:34), Max: 99.6 (08-23-23 @ 17:15)  HR: 78 (08-24-23 @ 08:34) (78 - 79)  BP: 145/83 (08-24-23 @ 08:34) (144/89 - 145/83)  BP(mean): --  RR: 16 (08-24-23 @ 08:34) (16 - 18)  SpO2: 98% (08-24-23 @ 08:34) (96% - 98%)

## 2023-08-23 NOTE — BH INPATIENT PSYCHIATRY PROGRESS NOTE - CURRENT MEDICATION
MEDICATIONS  (STANDING):  amLODIPine   Tablet 5 milliGRAM(s) Oral daily  glucagon  Injectable 1 milliGRAM(s) IntraMuscular once  insulin lispro (ADMELOG) corrective regimen sliding scale   SubCutaneous three times a day before meals  metFORMIN 1000 milliGRAM(s) Oral two times a day  OXcarbazepine 600 milliGRAM(s) Oral two times a day  QUEtiapine 300 milliGRAM(s) Oral at bedtime  QUEtiapine 200 milliGRAM(s) Oral daily    MEDICATIONS  (PRN):  acetaminophen     Tablet .. 650 milliGRAM(s) Oral every 6 hours PRN Temp greater or equal to 38C (100.4F), Mild Pain (1 - 3), Moderate Pain (4 - 6), Severe Pain (7 - 10)  aluminum hydroxide/magnesium hydroxide/simethicone Suspension 30 milliLiter(s) Oral every 4 hours PRN Dyspepsia  benzocaine/menthol Lozenge 1 Lozenge Oral every 4 hours PRN sore throat  dextrose Oral Gel 15 Gram(s) Oral once PRN Blood Glucose LESS THAN 70 milliGRAM(s)/deciliter  magnesium hydroxide Suspension 30 milliLiter(s) Oral daily PRN Constipation  melatonin 3 milliGRAM(s) Oral at bedtime PRN Insomnia  QUEtiapine 50 milliGRAM(s) Oral every 4 hours PRN agitation  traZODone 50 milliGRAM(s) Oral at bedtime PRN insomnia   MEDICATIONS  (STANDING):  amLODIPine   Tablet 5 milliGRAM(s) Oral daily  glucagon  Injectable 1 milliGRAM(s) IntraMuscular once  insulin lispro (ADMELOG) corrective regimen sliding scale   SubCutaneous three times a day before meals  metFORMIN 1000 milliGRAM(s) Oral two times a day  OXcarbazepine 600 milliGRAM(s) Oral two times a day  QUEtiapine 200 milliGRAM(s) Oral daily  QUEtiapine 300 milliGRAM(s) Oral at bedtime    MEDICATIONS  (PRN):  acetaminophen     Tablet .. 650 milliGRAM(s) Oral every 6 hours PRN Temp greater or equal to 38C (100.4F), Mild Pain (1 - 3), Moderate Pain (4 - 6), Severe Pain (7 - 10)  aluminum hydroxide/magnesium hydroxide/simethicone Suspension 30 milliLiter(s) Oral every 4 hours PRN Dyspepsia  benzocaine/menthol Lozenge 1 Lozenge Oral every 4 hours PRN sore throat  dextrose Oral Gel 15 Gram(s) Oral once PRN Blood Glucose LESS THAN 70 milliGRAM(s)/deciliter  magnesium hydroxide Suspension 30 milliLiter(s) Oral daily PRN Constipation  melatonin 3 milliGRAM(s) Oral at bedtime PRN Insomnia  QUEtiapine 50 milliGRAM(s) Oral every 4 hours PRN agitation  traZODone 50 milliGRAM(s) Oral at bedtime PRN insomnia

## 2023-08-23 NOTE — BH INPATIENT PSYCHIATRY PROGRESS NOTE - NSBHMETABOLIC_PSY_ALL_CORE_FT
BMI: BMI (kg/m2): 29.5 (08-23-23 @ 09:44)  HbA1c: A1C with Estimated Average Glucose Result: 9.7 % (08-22-23 @ 05:30)    Glucose: POCT Blood Glucose.: 171 mg/dL (08-23-23 @ 11:34)    BP: 123/74 (08-23-23 @ 09:44) (122/77 - 148/94)  Lipid Panel: Date/Time: 08-22-23 @ 05:30  Cholesterol, Serum: 179  Direct LDL: --  HDL Cholesterol, Serum: 45  Total Cholesterol/HDL Ration Measurement: --  Triglycerides, Serum: 270   BMI: BMI (kg/m2): 29.5 (08-23-23 @ 09:44)  HbA1c: A1C with Estimated Average Glucose Result: 9.7 % (08-22-23 @ 05:30)    Glucose: POCT Blood Glucose.: 172 mg/dL (08-24-23 @ 08:03)    BP: 145/83 (08-24-23 @ 08:34) (123/74 - 148/94)  Lipid Panel: Date/Time: 08-22-23 @ 05:30  Cholesterol, Serum: 179  Direct LDL: --  HDL Cholesterol, Serum: 45  Total Cholesterol/HDL Ration Measurement: --  Triglycerides, Serum: 270

## 2023-08-23 NOTE — BH INPATIENT PSYCHIATRY PROGRESS NOTE - NSBHFUPINTERVALHXFT_PSY_A_CORE
Patient mostly in her room today, is seen later in the afternoon in the milieu with SW. Patient continues to report improvement with mood and medications, though mood overall is low. Seroquel to be increased at qhs to 300mg. Attending some select groups, remaining in contact with peers and family.  No acute medical concerns at this time. Sleep and appetite are fair. No si/hi/avh or PI endorsed

## 2023-08-24 LAB
GLUCOSE BLDC GLUCOMTR-MCNC: 170 MG/DL — HIGH (ref 70–99)
GLUCOSE BLDC GLUCOMTR-MCNC: 172 MG/DL — HIGH (ref 70–99)
GLUCOSE BLDC GLUCOMTR-MCNC: 205 MG/DL — HIGH (ref 70–99)
GLUCOSE BLDC GLUCOMTR-MCNC: 214 MG/DL — HIGH (ref 70–99)

## 2023-08-24 PROCEDURE — 99231 SBSQ HOSP IP/OBS SF/LOW 25: CPT

## 2023-08-24 RX ORDER — QUETIAPINE FUMARATE 200 MG/1
400 TABLET, FILM COATED ORAL AT BEDTIME
Refills: 0 | Status: DISCONTINUED | OUTPATIENT
Start: 2023-08-24 | End: 2023-08-29

## 2023-08-24 RX ADMIN — OXCARBAZEPINE 600 MILLIGRAM(S): 300 TABLET, FILM COATED ORAL at 11:16

## 2023-08-24 RX ADMIN — METFORMIN HYDROCHLORIDE 1000 MILLIGRAM(S): 850 TABLET ORAL at 22:04

## 2023-08-24 RX ADMIN — Medication 650 MILLIGRAM(S): at 19:17

## 2023-08-24 RX ADMIN — QUETIAPINE FUMARATE 200 MILLIGRAM(S): 200 TABLET, FILM COATED ORAL at 11:16

## 2023-08-24 RX ADMIN — QUETIAPINE FUMARATE 400 MILLIGRAM(S): 200 TABLET, FILM COATED ORAL at 22:04

## 2023-08-24 RX ADMIN — Medication 650 MILLIGRAM(S): at 18:17

## 2023-08-24 RX ADMIN — OXCARBAZEPINE 600 MILLIGRAM(S): 300 TABLET, FILM COATED ORAL at 22:04

## 2023-08-24 RX ADMIN — METFORMIN HYDROCHLORIDE 1000 MILLIGRAM(S): 850 TABLET ORAL at 11:16

## 2023-08-24 RX ADMIN — AMLODIPINE BESYLATE 5 MILLIGRAM(S): 2.5 TABLET ORAL at 11:16

## 2023-08-24 NOTE — BH INPATIENT PSYCHIATRY PROGRESS NOTE - NSBHMETABOLIC_PSY_ALL_CORE_FT
BMI: BMI (kg/m2): 29.5 (08-23-23 @ 09:44)  HbA1c: A1C with Estimated Average Glucose Result: 9.7 % (08-22-23 @ 05:30)    Glucose: POCT Blood Glucose.: 172 mg/dL (08-24-23 @ 08:03)    BP: 145/83 (08-24-23 @ 10:27) (123/74 - 148/94)  Lipid Panel: Date/Time: 08-22-23 @ 05:30  Cholesterol, Serum: 179  Direct LDL: --  HDL Cholesterol, Serum: 45  Total Cholesterol/HDL Ration Measurement: --  Triglycerides, Serum: 270

## 2023-08-24 NOTE — BH INPATIENT PSYCHIATRY PROGRESS NOTE - NSBHFUPINTERVALHXFT_PSY_A_CORE
Patient seen in her room today, alert and awake, reports improvement in mood and lessening of irritability. Tolerating recent changes in seroquel doses. Denies si/hi/avh or PI. No acute medical concerns. Fair sleep and appetite.

## 2023-08-24 NOTE — BH INPATIENT PSYCHIATRY PROGRESS NOTE - CURRENT MEDICATION
MEDICATIONS  (STANDING):  amLODIPine   Tablet 5 milliGRAM(s) Oral daily  glucagon  Injectable 1 milliGRAM(s) IntraMuscular once  insulin lispro (ADMELOG) corrective regimen sliding scale   SubCutaneous three times a day before meals  metFORMIN 1000 milliGRAM(s) Oral two times a day  OXcarbazepine 600 milliGRAM(s) Oral two times a day  QUEtiapine 200 milliGRAM(s) Oral daily  QUEtiapine 400 milliGRAM(s) Oral at bedtime    MEDICATIONS  (PRN):  acetaminophen     Tablet .. 650 milliGRAM(s) Oral every 6 hours PRN Temp greater or equal to 38C (100.4F), Mild Pain (1 - 3), Moderate Pain (4 - 6), Severe Pain (7 - 10)  aluminum hydroxide/magnesium hydroxide/simethicone Suspension 30 milliLiter(s) Oral every 4 hours PRN Dyspepsia  benzocaine/menthol Lozenge 1 Lozenge Oral every 4 hours PRN sore throat  dextrose Oral Gel 15 Gram(s) Oral once PRN Blood Glucose LESS THAN 70 milliGRAM(s)/deciliter  magnesium hydroxide Suspension 30 milliLiter(s) Oral daily PRN Constipation  melatonin 3 milliGRAM(s) Oral at bedtime PRN Insomnia  QUEtiapine 50 milliGRAM(s) Oral every 4 hours PRN agitation  traZODone 50 milliGRAM(s) Oral at bedtime PRN insomnia

## 2023-08-24 NOTE — BH INPATIENT PSYCHIATRY PROGRESS NOTE - NSBHCONSDANGERSELF_PSY_A_CORE
suicidal behavior
unable to care for self
suicidal behavior

## 2023-08-24 NOTE — BH INPATIENT PSYCHIATRY PROGRESS NOTE - NSBHCHARTREVIEWVS_PSY_A_CORE FT
Vital Signs Last 24 Hrs  T(C): 37.3 (08-24-23 @ 10:27), Max: 37.6 (08-23-23 @ 17:15)  T(F): 99.2 (08-24-23 @ 10:27), Max: 99.6 (08-23-23 @ 17:15)  HR: 78 (08-24-23 @ 10:27) (78 - 79)  BP: 145/83 (08-24-23 @ 10:27) (144/89 - 145/83)  BP(mean): --  RR: 16 (08-24-23 @ 10:27) (16 - 18)  SpO2: 98% (08-24-23 @ 10:27) (96% - 98%)

## 2023-08-24 NOTE — BH INPATIENT PSYCHIATRY PROGRESS NOTE - NSBHASSESSSUMMFT_PSY_ALL_CORE
This is a 51y/o female unmarried, unemployed, undomiciled-street homeless for 5 months. PMH significant for PCOS, DM II, and HTN. Psych history significant for reported Bipolar disorder, multiple psych admissions(>20), last admitted to [facility] from 6/29-7/27/20. Previous hospitalization at [facility] in 2016. Patient denies hx of SA/NSSIB, denies h/o aggression, denies active substance use. +legal hx(reports having been arrested twice for trespassing). Reports that she is in outpt care with Dr. Novak at [facility] prescribed Quetiapine 400 mg BID and Trileptal 600 mg BID which she has been non-compliant with. Patient self presents to the ED with complaints of si/hi, worsening mood and irritability in context of medication non-compliance x1 month. Seeking voluntary admission. Patient transferred to [facility] voluntary status.    She reports she eventually wants to be back to quetiapine 400 mg BID and trileptal 600 mg BID due to stability on these doses. She reports some irritability, steadily improving mood, and no other acute complaints. She denies SI today.     - Admitted 9.13  -  quetiapine 200mg qd and 400mg qhs   -  metformin 1000mg bid  - Diabetic diet  - Insulin sliding scale  - Continue amlodipine 5mg daily  - quetiapine 50mg q4h PRN agitation  - trazodone 50mg qhs PRN insomnia

## 2023-08-25 LAB
GLUCOSE BLDC GLUCOMTR-MCNC: 193 MG/DL — HIGH (ref 70–99)
GLUCOSE BLDC GLUCOMTR-MCNC: 197 MG/DL — HIGH (ref 70–99)
GLUCOSE BLDC GLUCOMTR-MCNC: 218 MG/DL — HIGH (ref 70–99)
GLUCOSE BLDC GLUCOMTR-MCNC: 218 MG/DL — HIGH (ref 70–99)

## 2023-08-25 PROCEDURE — 99231 SBSQ HOSP IP/OBS SF/LOW 25: CPT

## 2023-08-25 RX ORDER — QUETIAPINE FUMARATE 200 MG/1
300 TABLET, FILM COATED ORAL DAILY
Refills: 0 | Status: DISCONTINUED | OUTPATIENT
Start: 2023-08-25 | End: 2023-08-29

## 2023-08-25 RX ADMIN — QUETIAPINE FUMARATE 400 MILLIGRAM(S): 200 TABLET, FILM COATED ORAL at 21:48

## 2023-08-25 RX ADMIN — AMLODIPINE BESYLATE 5 MILLIGRAM(S): 2.5 TABLET ORAL at 10:45

## 2023-08-25 RX ADMIN — METFORMIN HYDROCHLORIDE 1000 MILLIGRAM(S): 850 TABLET ORAL at 21:48

## 2023-08-25 RX ADMIN — OXCARBAZEPINE 600 MILLIGRAM(S): 300 TABLET, FILM COATED ORAL at 10:46

## 2023-08-25 RX ADMIN — METFORMIN HYDROCHLORIDE 1000 MILLIGRAM(S): 850 TABLET ORAL at 10:45

## 2023-08-25 RX ADMIN — OXCARBAZEPINE 600 MILLIGRAM(S): 300 TABLET, FILM COATED ORAL at 21:48

## 2023-08-25 RX ADMIN — QUETIAPINE FUMARATE 200 MILLIGRAM(S): 200 TABLET, FILM COATED ORAL at 10:45

## 2023-08-25 NOTE — BH INPATIENT PSYCHIATRY PROGRESS NOTE - NSBHCHARTREVIEWVS_PSY_A_CORE FT
Vital Signs Last 24 Hrs  T(C): 36.7 (08-24-23 @ 16:23), Max: 36.7 (08-24-23 @ 16:23)  T(F): 98.1 (08-24-23 @ 16:23), Max: 98.1 (08-24-23 @ 16:23)  HR: 100 (08-24-23 @ 16:23) (100 - 100)  BP: 146/78 (08-24-23 @ 16:23) (146/78 - 146/78)  BP(mean): --  RR: 18 (08-24-23 @ 16:23) (18 - 18)  SpO2: 97% (08-24-23 @ 16:23) (97% - 97%)

## 2023-08-25 NOTE — BH INPATIENT PSYCHIATRY PROGRESS NOTE - NSBHASSESSSUMMFT_PSY_ALL_CORE
This is a 53y/o female unmarried, unemployed, undomiciled-street homeless for 5 months. PMH significant for PCOS, DM II, and HTN. Psych history significant for reported Bipolar disorder, multiple psych admissions(>20), last admitted to [facility] from 6/29-7/27/20. Previous hospitalization at [facility] in 2016. Patient denies hx of SA/NSSIB, denies h/o aggression, denies active substance use. +legal hx(reports having been arrested twice for trespassing). Reports that she is in outpt care with Dr. Novak at [facility] prescribed Quetiapine 400 mg BID and Trileptal 600 mg BID which she has been non-compliant with. Patient self presents to the ED with complaints of si/hi, worsening mood and irritability in context of medication non-compliance x1 month. Seeking voluntary admission. Patient transferred to [facility] voluntary status.    She reports she eventually wants to be back to quetiapine 400 mg BID and trileptal 600 mg BID due to stability on these doses. She reports some irritability, steadily improving mood, and no other acute complaints. She denies SI today.     - Admitted 9.13  -  quetiapine 300mg qd and 400mg qhs   -  metformin 1000mg bid  - Diabetic diet  - Insulin sliding scale  - Continue amlodipine 5mg daily  - quetiapine 50mg q4h PRN agitation  - trazodone 50mg qhs PRN insomnia

## 2023-08-25 NOTE — BH INPATIENT PSYCHIATRY PROGRESS NOTE - NSBHMETABOLIC_PSY_ALL_CORE_FT
BMI: BMI (kg/m2): 29.5 (08-23-23 @ 09:44)  HbA1c: A1C with Estimated Average Glucose Result: 9.7 % (08-22-23 @ 05:30)    Glucose: POCT Blood Glucose.: 193 mg/dL (08-25-23 @ 12:02)    BP: 146/78 (08-24-23 @ 16:23) (123/74 - 148/94)  Lipid Panel: Date/Time: 08-22-23 @ 05:30  Cholesterol, Serum: 179  Direct LDL: --  HDL Cholesterol, Serum: 45  Total Cholesterol/HDL Ration Measurement: --  Triglycerides, Serum: 270

## 2023-08-25 NOTE — BH INPATIENT PSYCHIATRY PROGRESS NOTE - NSBHFUPINTERVALHXFT_PSY_A_CORE
Patient visible on the unit this morning, reports continued improvement in mood and sxs, tolerating titrations without issue. No si/hi/avh or PI endorsed. No acute medical concerns. Sleep and appetite continue to be fair.

## 2023-08-25 NOTE — BH INPATIENT PSYCHIATRY PROGRESS NOTE - CURRENT MEDICATION
MEDICATIONS  (STANDING):  amLODIPine   Tablet 5 milliGRAM(s) Oral daily  glucagon  Injectable 1 milliGRAM(s) IntraMuscular once  insulin lispro (ADMELOG) corrective regimen sliding scale   SubCutaneous three times a day before meals  metFORMIN 1000 milliGRAM(s) Oral two times a day  OXcarbazepine 600 milliGRAM(s) Oral two times a day  QUEtiapine 400 milliGRAM(s) Oral at bedtime  QUEtiapine 300 milliGRAM(s) Oral daily    MEDICATIONS  (PRN):  acetaminophen     Tablet .. 650 milliGRAM(s) Oral every 6 hours PRN Temp greater or equal to 38C (100.4F), Mild Pain (1 - 3), Moderate Pain (4 - 6), Severe Pain (7 - 10)  aluminum hydroxide/magnesium hydroxide/simethicone Suspension 30 milliLiter(s) Oral every 4 hours PRN Dyspepsia  benzocaine/menthol Lozenge 1 Lozenge Oral every 4 hours PRN sore throat  dextrose Oral Gel 15 Gram(s) Oral once PRN Blood Glucose LESS THAN 70 milliGRAM(s)/deciliter  magnesium hydroxide Suspension 30 milliLiter(s) Oral daily PRN Constipation  melatonin 3 milliGRAM(s) Oral at bedtime PRN Insomnia  QUEtiapine 50 milliGRAM(s) Oral every 4 hours PRN agitation  traZODone 50 milliGRAM(s) Oral at bedtime PRN insomnia

## 2023-08-26 LAB
GLUCOSE BLDC GLUCOMTR-MCNC: 130 MG/DL — HIGH (ref 70–99)
GLUCOSE BLDC GLUCOMTR-MCNC: 181 MG/DL — HIGH (ref 70–99)
GLUCOSE BLDC GLUCOMTR-MCNC: 220 MG/DL — HIGH (ref 70–99)
GLUCOSE BLDC GLUCOMTR-MCNC: 243 MG/DL — HIGH (ref 70–99)

## 2023-08-26 RX ADMIN — OXCARBAZEPINE 600 MILLIGRAM(S): 300 TABLET, FILM COATED ORAL at 22:10

## 2023-08-26 RX ADMIN — AMLODIPINE BESYLATE 5 MILLIGRAM(S): 2.5 TABLET ORAL at 11:23

## 2023-08-26 RX ADMIN — METFORMIN HYDROCHLORIDE 1000 MILLIGRAM(S): 850 TABLET ORAL at 11:23

## 2023-08-26 RX ADMIN — QUETIAPINE FUMARATE 400 MILLIGRAM(S): 200 TABLET, FILM COATED ORAL at 22:09

## 2023-08-26 RX ADMIN — QUETIAPINE FUMARATE 300 MILLIGRAM(S): 200 TABLET, FILM COATED ORAL at 11:23

## 2023-08-26 RX ADMIN — OXCARBAZEPINE 600 MILLIGRAM(S): 300 TABLET, FILM COATED ORAL at 11:23

## 2023-08-26 RX ADMIN — METFORMIN HYDROCHLORIDE 1000 MILLIGRAM(S): 850 TABLET ORAL at 22:09

## 2023-08-27 LAB
GLUCOSE BLDC GLUCOMTR-MCNC: 220 MG/DL — HIGH (ref 70–99)
GLUCOSE BLDC GLUCOMTR-MCNC: 246 MG/DL — HIGH (ref 70–99)
GLUCOSE BLDC GLUCOMTR-MCNC: 254 MG/DL — HIGH (ref 70–99)
GLUCOSE BLDC GLUCOMTR-MCNC: 268 MG/DL — HIGH (ref 70–99)

## 2023-08-27 RX ADMIN — AMLODIPINE BESYLATE 5 MILLIGRAM(S): 2.5 TABLET ORAL at 10:35

## 2023-08-27 RX ADMIN — OXCARBAZEPINE 600 MILLIGRAM(S): 300 TABLET, FILM COATED ORAL at 21:23

## 2023-08-27 RX ADMIN — QUETIAPINE FUMARATE 400 MILLIGRAM(S): 200 TABLET, FILM COATED ORAL at 21:23

## 2023-08-27 RX ADMIN — METFORMIN HYDROCHLORIDE 1000 MILLIGRAM(S): 850 TABLET ORAL at 10:36

## 2023-08-27 RX ADMIN — METFORMIN HYDROCHLORIDE 1000 MILLIGRAM(S): 850 TABLET ORAL at 21:23

## 2023-08-27 RX ADMIN — OXCARBAZEPINE 600 MILLIGRAM(S): 300 TABLET, FILM COATED ORAL at 10:35

## 2023-08-27 RX ADMIN — QUETIAPINE FUMARATE 300 MILLIGRAM(S): 200 TABLET, FILM COATED ORAL at 10:36

## 2023-08-28 LAB
GLUCOSE BLDC GLUCOMTR-MCNC: 176 MG/DL — HIGH (ref 70–99)
GLUCOSE BLDC GLUCOMTR-MCNC: 195 MG/DL — HIGH (ref 70–99)
GLUCOSE BLDC GLUCOMTR-MCNC: 249 MG/DL — HIGH (ref 70–99)

## 2023-08-28 PROCEDURE — 99231 SBSQ HOSP IP/OBS SF/LOW 25: CPT

## 2023-08-28 RX ADMIN — OXCARBAZEPINE 600 MILLIGRAM(S): 300 TABLET, FILM COATED ORAL at 11:50

## 2023-08-28 RX ADMIN — OXCARBAZEPINE 600 MILLIGRAM(S): 300 TABLET, FILM COATED ORAL at 21:45

## 2023-08-28 RX ADMIN — QUETIAPINE FUMARATE 400 MILLIGRAM(S): 200 TABLET, FILM COATED ORAL at 21:45

## 2023-08-28 RX ADMIN — QUETIAPINE FUMARATE 300 MILLIGRAM(S): 200 TABLET, FILM COATED ORAL at 11:50

## 2023-08-28 RX ADMIN — METFORMIN HYDROCHLORIDE 1000 MILLIGRAM(S): 850 TABLET ORAL at 21:45

## 2023-08-28 RX ADMIN — AMLODIPINE BESYLATE 5 MILLIGRAM(S): 2.5 TABLET ORAL at 11:50

## 2023-08-28 RX ADMIN — METFORMIN HYDROCHLORIDE 1000 MILLIGRAM(S): 850 TABLET ORAL at 11:50

## 2023-08-28 NOTE — BH INPATIENT PSYCHIATRY PROGRESS NOTE - NSBHCHARTREVIEWVS_PSY_A_CORE FT
Vital Signs Last 24 Hrs  T(C): 37.2 (08-28-23 @ 16:16), Max: 37.5 (08-28-23 @ 08:30)  T(F): 99 (08-28-23 @ 16:16), Max: 99.5 (08-28-23 @ 08:30)  HR: 96 (08-28-23 @ 16:16) (76 - 96)  BP: 132/84 (08-28-23 @ 16:16) (132/84 - 135/88)  BP(mean): --  RR: 18 (08-28-23 @ 16:16) (18 - 18)  SpO2: 96% (08-28-23 @ 16:16) (96% - 98%)

## 2023-08-28 NOTE — BH INPATIENT PSYCHIATRY PROGRESS NOTE - NSTXSUICIDINTERMD_PSY_ALL_CORE
Psychopharmacology x15 minutes

## 2023-08-28 NOTE — BH INPATIENT PSYCHIATRY PROGRESS NOTE - NSBHFUPINTERVALCCFT_PSY_A_CORE
"tired"
"I've been sleeping more getting used to my medications" 
'I still feel the same'
'I'm feeling better today'
'mood is better'
'mood is a little better today'
"I'm looking forward to discharge."
'mood is a little better'
'mood is a little better today'

## 2023-08-28 NOTE — BH INPATIENT PSYCHIATRY PROGRESS NOTE - CURRENT MEDICATION
MEDICATIONS  (STANDING):  amLODIPine   Tablet 5 milliGRAM(s) Oral daily  glucagon  Injectable 1 milliGRAM(s) IntraMuscular once  insulin lispro (ADMELOG) corrective regimen sliding scale   SubCutaneous Before meals and at bedtime  metFORMIN 1000 milliGRAM(s) Oral two times a day  OXcarbazepine 600 milliGRAM(s) Oral two times a day  QUEtiapine 400 milliGRAM(s) Oral at bedtime  QUEtiapine 300 milliGRAM(s) Oral daily    MEDICATIONS  (PRN):  acetaminophen     Tablet .. 650 milliGRAM(s) Oral every 6 hours PRN Temp greater or equal to 38C (100.4F), Mild Pain (1 - 3), Moderate Pain (4 - 6), Severe Pain (7 - 10)  aluminum hydroxide/magnesium hydroxide/simethicone Suspension 30 milliLiter(s) Oral every 4 hours PRN Dyspepsia  benzocaine/menthol Lozenge 1 Lozenge Oral every 4 hours PRN sore throat  dextrose Oral Gel 15 Gram(s) Oral once PRN Blood Glucose LESS THAN 70 milliGRAM(s)/deciliter  magnesium hydroxide Suspension 30 milliLiter(s) Oral daily PRN Constipation  melatonin 3 milliGRAM(s) Oral at bedtime PRN Insomnia  QUEtiapine 50 milliGRAM(s) Oral every 4 hours PRN agitation  traZODone 50 milliGRAM(s) Oral at bedtime PRN insomnia

## 2023-08-28 NOTE — BH INPATIENT PSYCHIATRY PROGRESS NOTE - NSBHMSETHTCONTENT_PSY_A_CORE
Unremarkable
Suicidality/Homicidality
Unremarkable
Suicidality/Homicidality
Unremarkable
Unremarkable

## 2023-08-28 NOTE — BH INPATIENT PSYCHIATRY PROGRESS NOTE - NSBHFUPINTERVALHXFT_PSY_A_CORE
Patient eager for discharge,  reports continued improvement in mood and sxs, tolerating titrations without issue. No si/hi/avh or PI endorsed. No acute medical concerns. Sleep and appetite good. Pt had questions for me about what might be making her dizzy. Orthostatic hypotension from seroquel is possible but she says it happens when she has been in the same position playing Scrabble for a long time. She also asked about what might be causing tingles in her limbs (peripheral neuropathy from diabetes?)

## 2023-08-28 NOTE — BH INPATIENT PSYCHIATRY PROGRESS NOTE - NSTXSUICIDDATEEST_PSY_ALL_CORE
16-Aug-2023

## 2023-08-28 NOTE — BH INPATIENT PSYCHIATRY PROGRESS NOTE - NSICDXBHSECONDARYDX_PSY_ALL_CORE
Bipolar disorder, current episode mixed, moderate   F31.62  
Yes
Bipolar disorder, current episode mixed, moderate   F31.62  

## 2023-08-28 NOTE — BH INPATIENT PSYCHIATRY PROGRESS NOTE - NSTXDEPRESDATETRGT_PSY_ALL_CORE
24-Aug-2023
30-Aug-2023
23-Aug-2023
28-Aug-2023
30-Aug-2023
30-Aug-2023
23-Aug-2023
23-Aug-2023
30-Aug-2023

## 2023-08-28 NOTE — BH INPATIENT PSYCHIATRY PROGRESS NOTE - NSICDXBHPRIMARYDX_PSY_ALL_CORE
Bipolar 1 disorder   F31.9  

## 2023-08-28 NOTE — BH INPATIENT PSYCHIATRY PROGRESS NOTE - NSTXDEPRESDATEEST_PSY_ALL_CORE
23-Aug-2023
17-Aug-2023
17-Aug-2023
23-Aug-2023
17-Aug-2023
17-Aug-2023
23-Aug-2023
23-Aug-2023
17-Aug-2023

## 2023-08-28 NOTE — BH INPATIENT PSYCHIATRY PROGRESS NOTE - PRN MEDS
MEDICATIONS  (PRN):  acetaminophen     Tablet .. 650 milliGRAM(s) Oral every 6 hours PRN Temp greater or equal to 38C (100.4F), Mild Pain (1 - 3), Moderate Pain (4 - 6), Severe Pain (7 - 10)  aluminum hydroxide/magnesium hydroxide/simethicone Suspension 30 milliLiter(s) Oral every 4 hours PRN Dyspepsia  benzocaine/menthol Lozenge 1 Lozenge Oral every 4 hours PRN sore throat  dextrose Oral Gel 15 Gram(s) Oral once PRN Blood Glucose LESS THAN 70 milliGRAM(s)/deciliter  magnesium hydroxide Suspension 30 milliLiter(s) Oral daily PRN Constipation  melatonin 3 milliGRAM(s) Oral at bedtime PRN Insomnia  QUEtiapine 50 milliGRAM(s) Oral every 4 hours PRN agitation  traZODone 50 milliGRAM(s) Oral at bedtime PRN insomnia  
MEDICATIONS  (PRN):  acetaminophen     Tablet .. 650 milliGRAM(s) Oral every 6 hours PRN Temp greater or equal to 38C (100.4F), Mild Pain (1 - 3), Moderate Pain (4 - 6), Severe Pain (7 - 10)  aluminum hydroxide/magnesium hydroxide/simethicone Suspension 30 milliLiter(s) Oral every 4 hours PRN Dyspepsia  benzocaine/menthol Lozenge 1 Lozenge Oral every 4 hours PRN sore throat  dextrose Oral Gel 15 Gram(s) Oral once PRN Blood Glucose LESS THAN 70 milliGRAM(s)/deciliter  magnesium hydroxide Suspension 30 milliLiter(s) Oral daily PRN Constipation  melatonin 3 milliGRAM(s) Oral at bedtime PRN Insomnia  QUEtiapine 50 milliGRAM(s) Oral every 4 hours PRN agitation  traZODone 50 milliGRAM(s) Oral at bedtime PRN insomnia  
MEDICATIONS  (PRN):  acetaminophen     Tablet .. 650 milliGRAM(s) Oral every 6 hours PRN Temp greater or equal to 38C (100.4F), Mild Pain (1 - 3), Moderate Pain (4 - 6), Severe Pain (7 - 10)  aluminum hydroxide/magnesium hydroxide/simethicone Suspension 30 milliLiter(s) Oral every 4 hours PRN Dyspepsia  benzocaine/menthol Lozenge 1 Lozenge Oral every 4 hours PRN sore throat  magnesium hydroxide Suspension 30 milliLiter(s) Oral daily PRN Constipation  melatonin 3 milliGRAM(s) Oral at bedtime PRN Insomnia  QUEtiapine 50 milliGRAM(s) Oral every 4 hours PRN agitation  traZODone 50 milliGRAM(s) Oral at bedtime PRN insomnia  
MEDICATIONS  (PRN):  acetaminophen     Tablet .. 650 milliGRAM(s) Oral every 6 hours PRN Temp greater or equal to 38C (100.4F), Mild Pain (1 - 3), Moderate Pain (4 - 6), Severe Pain (7 - 10)  aluminum hydroxide/magnesium hydroxide/simethicone Suspension 30 milliLiter(s) Oral every 4 hours PRN Dyspepsia  benzocaine/menthol Lozenge 1 Lozenge Oral every 4 hours PRN sore throat  dextrose Oral Gel 15 Gram(s) Oral once PRN Blood Glucose LESS THAN 70 milliGRAM(s)/deciliter  magnesium hydroxide Suspension 30 milliLiter(s) Oral daily PRN Constipation  melatonin 3 milliGRAM(s) Oral at bedtime PRN Insomnia  QUEtiapine 50 milliGRAM(s) Oral every 4 hours PRN agitation  traZODone 50 milliGRAM(s) Oral at bedtime PRN insomnia  
MEDICATIONS  (PRN):  acetaminophen     Tablet .. 650 milliGRAM(s) Oral every 6 hours PRN Temp greater or equal to 38C (100.4F), Mild Pain (1 - 3), Moderate Pain (4 - 6), Severe Pain (7 - 10)  aluminum hydroxide/magnesium hydroxide/simethicone Suspension 30 milliLiter(s) Oral every 4 hours PRN Dyspepsia  benzocaine/menthol Lozenge 1 Lozenge Oral every 4 hours PRN sore throat  magnesium hydroxide Suspension 30 milliLiter(s) Oral daily PRN Constipation  melatonin 3 milliGRAM(s) Oral at bedtime PRN Insomnia  QUEtiapine 50 milliGRAM(s) Oral every 4 hours PRN agitation  traZODone 50 milliGRAM(s) Oral at bedtime PRN insomnia

## 2023-08-28 NOTE — BH INPATIENT PSYCHIATRY PROGRESS NOTE - NSICDXBHTERTIARYDX_PSY_ALL_CORE
R/O Malingering   Z76.5  

## 2023-08-28 NOTE — BH INPATIENT PSYCHIATRY PROGRESS NOTE - NSBHCONSULTIPREASON_PSY_A_CORE
danger to self; mental illness expected to respond to inpatient care
other reason
danger to self; mental illness expected to respond to inpatient care
danger to self; mental illness expected to respond to inpatient care
other reason

## 2023-08-28 NOTE — BH INPATIENT PSYCHIATRY PROGRESS NOTE - NSDCCRITERIA_PSY_ALL_CORE
Improvement in mood and sxs

## 2023-08-28 NOTE — BH INPATIENT PSYCHIATRY PROGRESS NOTE - NSTXSUICIDDATETRGT_PSY_ALL_CORE
30-Aug-2023
23-Aug-2023
28-Aug-2023
23-Aug-2023
28-Aug-2023
23-Aug-2023
28-Aug-2023
30-Aug-2023
28-Aug-2023

## 2023-08-28 NOTE — BH INPATIENT PSYCHIATRY PROGRESS NOTE - NSBHATTESTTYPEVISIT_PSY_A_CORE
On-site Attending supervising RAPHAEL (99XXX codes)
Attending Only
On-site Attending supervising RAPHAEL (99XXX codes)

## 2023-08-28 NOTE — BH INPATIENT PSYCHIATRY PROGRESS NOTE - NSBHASSESSSUMMFT_PSY_ALL_CORE
This is a 53y/o female unmarried, unemployed, undomiciled-street homeless for 5 months. PMH significant for PCOS, DM II, and HTN. Psych history significant for reported Bipolar disorder, multiple psych admissions(>20), last admitted to [facility] from 6/29-7/27/20. Previous hospitalization at [facility] in 2016. Patient denies hx of SA/NSSIB, denies h/o aggression, denies active substance use. +legal hx(reports having been arrested twice for trespassing). Reports that she is in outpt care with Dr. Novak at [facility] prescribed Quetiapine 400 mg BID and Trileptal 600 mg BID which she has been non-compliant with. Patient self presents to the ED with complaints of si/hi, worsening mood and irritability in context of medication non-compliance x1 month. Seeking voluntary admission. Patient transferred to [facility] voluntary status.    She reports she eventually wants to be back to quetiapine 400 mg BID and trileptal 600 mg BID due to stability on these doses. She reports some irritability, steadily improving mood, and no other acute complaints. She denies SI today.     - Admitted 9.13  -  quetiapine 300mg qd and 400mg qhs   -  metformin 1000mg bid  - Diabetic diet  - Insulin sliding scale  - Continue amlodipine 5mg daily  - quetiapine 50mg q4h PRN agitation  - trazodone 50mg qhs PRN insomnia

## 2023-08-28 NOTE — BH INPATIENT PSYCHIATRY PROGRESS NOTE - NSBHMSEBODY_PSY_A_CORE
Overweight
Average build
Overweight
Average build
Overweight

## 2023-08-28 NOTE — BH INPATIENT PSYCHIATRY PROGRESS NOTE - NSTXMEDICDATETRGT_PSY_ALL_CORE
30-Aug-2023
23-Aug-2023
28-Aug-2023
28-Aug-2023
23-Aug-2023
23-Aug-2023
28-Aug-2023
30-Aug-2023
28-Aug-2023

## 2023-08-28 NOTE — BH INPATIENT PSYCHIATRY PROGRESS NOTE - NSBHMETABOLIC_PSY_ALL_CORE_FT
BMI: BMI (kg/m2): 29.5 (08-23-23 @ 09:44)  HbA1c: A1C with Estimated Average Glucose Result: 9.7 % (08-22-23 @ 05:30)    Glucose: POCT Blood Glucose.: 195 mg/dL (08-28-23 @ 11:54)    BP: 132/84 (08-28-23 @ 16:16) (119/81 - 181/61)  Lipid Panel: Date/Time: 08-22-23 @ 05:30  Cholesterol, Serum: 179  Direct LDL: --  HDL Cholesterol, Serum: 45  Total Cholesterol/HDL Ration Measurement: --  Triglycerides, Serum: 270

## 2023-08-29 VITALS
DIASTOLIC BLOOD PRESSURE: 80 MMHG | WEIGHT: 182.1 LBS | OXYGEN SATURATION: 98 % | TEMPERATURE: 98 F | HEART RATE: 75 BPM | RESPIRATION RATE: 18 BRPM | SYSTOLIC BLOOD PRESSURE: 142 MMHG

## 2023-08-29 LAB
GLUCOSE BLDC GLUCOMTR-MCNC: 161 MG/DL — HIGH (ref 70–99)
GLUCOSE BLDC GLUCOMTR-MCNC: 215 MG/DL — HIGH (ref 70–99)

## 2023-08-29 PROCEDURE — 99285 EMERGENCY DEPT VISIT HI MDM: CPT

## 2023-08-29 PROCEDURE — 85025 COMPLETE CBC W/AUTO DIFF WBC: CPT

## 2023-08-29 PROCEDURE — 81001 URINALYSIS AUTO W/SCOPE: CPT

## 2023-08-29 PROCEDURE — 82962 GLUCOSE BLOOD TEST: CPT

## 2023-08-29 PROCEDURE — 80307 DRUG TEST PRSMV CHEM ANLYZR: CPT

## 2023-08-29 PROCEDURE — 87635 SARS-COV-2 COVID-19 AMP PRB: CPT

## 2023-08-29 PROCEDURE — 80061 LIPID PANEL: CPT

## 2023-08-29 PROCEDURE — 84702 CHORIONIC GONADOTROPIN TEST: CPT

## 2023-08-29 PROCEDURE — 99238 HOSP IP/OBS DSCHRG MGMT 30/<: CPT

## 2023-08-29 PROCEDURE — 85027 COMPLETE CBC AUTOMATED: CPT

## 2023-08-29 PROCEDURE — 80048 BASIC METABOLIC PNL TOTAL CA: CPT

## 2023-08-29 PROCEDURE — 36415 COLL VENOUS BLD VENIPUNCTURE: CPT

## 2023-08-29 PROCEDURE — 83036 HEMOGLOBIN GLYCOSYLATED A1C: CPT

## 2023-08-29 RX ORDER — AMLODIPINE BESYLATE 2.5 MG/1
1 TABLET ORAL
Qty: 14 | Refills: 0
Start: 2023-08-29 | End: 2023-09-11

## 2023-08-29 RX ORDER — AMLODIPINE BESYLATE 2.5 MG/1
1 TABLET ORAL
Qty: 0 | Refills: 0 | DISCHARGE
Start: 2023-08-29

## 2023-08-29 RX ORDER — QUETIAPINE FUMARATE 200 MG/1
1 TABLET, FILM COATED ORAL
Qty: 0 | Refills: 0 | DISCHARGE
Start: 2023-08-29

## 2023-08-29 RX ORDER — METFORMIN HYDROCHLORIDE 850 MG/1
1 TABLET ORAL
Qty: 28 | Refills: 0
Start: 2023-08-29 | End: 2023-09-11

## 2023-08-29 RX ORDER — QUETIAPINE FUMARATE 200 MG/1
1 TABLET, FILM COATED ORAL
Qty: 14 | Refills: 0
Start: 2023-08-29 | End: 2023-09-11

## 2023-08-29 RX ORDER — OXCARBAZEPINE 300 MG/1
1 TABLET, FILM COATED ORAL
Qty: 0 | Refills: 0 | DISCHARGE
Start: 2023-08-29

## 2023-08-29 RX ORDER — METFORMIN HYDROCHLORIDE 850 MG/1
1 TABLET ORAL
Qty: 0 | Refills: 0 | DISCHARGE
Start: 2023-08-29

## 2023-08-29 RX ORDER — OXCARBAZEPINE 300 MG/1
1 TABLET, FILM COATED ORAL
Qty: 28 | Refills: 0
Start: 2023-08-29 | End: 2023-09-11

## 2023-08-29 RX ADMIN — METFORMIN HYDROCHLORIDE 1000 MILLIGRAM(S): 850 TABLET ORAL at 10:47

## 2023-08-29 RX ADMIN — OXCARBAZEPINE 600 MILLIGRAM(S): 300 TABLET, FILM COATED ORAL at 10:47

## 2023-08-29 RX ADMIN — AMLODIPINE BESYLATE 5 MILLIGRAM(S): 2.5 TABLET ORAL at 10:47

## 2023-08-29 RX ADMIN — QUETIAPINE FUMARATE 300 MILLIGRAM(S): 200 TABLET, FILM COATED ORAL at 10:47

## 2023-08-29 NOTE — BH INPATIENT PSYCHIATRY DISCHARGE NOTE - NSBHASSESSSUMMFT_PSY_ALL_CORE
This is a 51y/o female unmarried, unemployed, undomiciled-street homeless for 5 months. PMH significant for PCOS, DM II, and HTN. Psych history significant for reported Bipolar disorder, multiple psych admissions(>20), last admitted to [facility] from 6/29-7/27/20. Previous hospitalization at [facility] in 2016. Patient denies hx of SA/NSSIB, denies h/o aggression, denies active substance use. +legal hx(reports having been arrested twice for trespassing). Reports that she is in outpt care with Dr. Novak at [facility] prescribed Quetiapine 400 mg BID and Trileptal 600 mg BID which she has been non-compliant with. Patient self presents to the ED with complaints of si/hi, worsening mood and irritability in context of medication non-compliance x1 month. Seeking voluntary admission. Patient transferred to [facility] voluntary status.    - Admitted 9.13  -  quetiapine 300mg qd and 400mg qhs   -  metformin 1000mg bid  - Diabetic diet  - Insulin sliding scale  - Continue amlodipine 5mg daily

## 2023-08-29 NOTE — BH INPATIENT PSYCHIATRY DISCHARGE NOTE - NSACTIVEVENT_PSY_ALL_CORE
Current or pending social isolation/Pending incarceration or homelessness/Inadequate social supports/Perceived burden on family or others

## 2023-08-29 NOTE — BH INPATIENT PSYCHIATRY DISCHARGE NOTE - NSBHFUPINTERVALHXFT_PSY_A_CORE
Patient seen in her room today resting on approach but is alert, anticipating discharge. Aware that current medications and dosages will be sent ot her preferred pharmacy. Has no concerns or questions. No si/hi/ahv or PI. Future oriented. No medical concerns.

## 2023-08-29 NOTE — BH INPATIENT PSYCHIATRY DISCHARGE NOTE - NSBHMETABOLIC_PSY_ALL_CORE_FT
BMI: BMI (kg/m2): 29.5 (08-23-23 @ 09:44)  HbA1c: A1C with Estimated Average Glucose Result: 9.7 % (08-22-23 @ 05:30)    Glucose: POCT Blood Glucose.: 161 mg/dL (08-29-23 @ 07:30)    BP: 132/84 (08-28-23 @ 16:16) (119/81 - 181/61)  Lipid Panel: Date/Time: 08-22-23 @ 05:30  Cholesterol, Serum: 179  Direct LDL: --  HDL Cholesterol, Serum: 45  Total Cholesterol/HDL Ration Measurement: --  Triglycerides, Serum: 270

## 2023-08-29 NOTE — BH INPATIENT PSYCHIATRY DISCHARGE NOTE - HOSPITAL COURSE
Patient endorsing irritability and requesting to be restarted on her home medications. Both trileptal and seroquel were restarted at low doses and ultimately titrated to 600mg bid and 300mg/400mg respectively. Additionally metformin and amlodidpine were added to regimen. Pt was subsequently placed on finger stick each meal and bed time but refused offered insulin lispro. Pt was also educated on seroquel's effect on her diabetes, acknowledged understanding but was not willing to pursue other med alternatives for mood stabilization. There was also high suspicion during this admission for secondary gain as pt's partner was also admitted in a psychiatric hospitalization just prior to her presenting eo [facility] with complaints of si. Also was noted to be very focused on SW to help her and her partner to get housing. Was intermittently visible on the unit attending some select groups. No si/hi/avh or PI at time of discharge.

## 2023-08-29 NOTE — BH INPATIENT PSYCHIATRY DISCHARGE NOTE - REASON FOR ADMISSION
Patient self presents to the ED with complaints of si/hi, worsening mood and irritability in context of medication non-compliance x1 month.

## 2023-08-29 NOTE — BH DISCHARGE NOTE NURSING/SOCIAL WORK/PSYCH REHAB - NSCDUDCCRISIS_PSY_A_CORE
[facility] Well  1 (725) [facility] ([phone])  Text "WELL" to 23985  Website: www.e-channel.FlowPay/.National Suicide Prevention Lifeline 7 (572) 576-7897/.  [employer]  1 (965) LIFENET (851-8501)/988 Suicide and Crisis Lifeline

## 2023-08-29 NOTE — BH INPATIENT PSYCHIATRY DISCHARGE NOTE - NSDCMRMEDTOKEN_GEN_ALL_CORE_FT
amLODIPine 5 mg oral tablet: 1 tab(s) orally once a day  metFORMIN 1000 mg oral tablet: 1 tab(s) orally 2 times a day  OXcarbazepine 600 mg oral tablet: 1 tab(s) orally 2 times a day  QUEtiapine 300 mg oral tablet: 1 tab(s) orally once a day  QUEtiapine 400 mg oral tablet: 1 tab(s) orally once a day (at bedtime)

## 2023-08-29 NOTE — BH DISCHARGE NOTE NURSING/SOCIAL WORK/PSYCH REHAB - PATIENT PORTAL LINK FT
You can access the FollowMyHealth Patient Portal offered by [facility] by registering at the following website: [email or web address]. By joining [employer] [facility]’s FollowMyHealth portal, you will also be able to view your health information using other applications (apps) compatible with our system.

## 2023-08-29 NOTE — BH INPATIENT PSYCHIATRY DISCHARGE NOTE - NSDCCPCAREPLAN_GEN_ALL_CORE_FT
PRINCIPAL DISCHARGE DIAGNOSIS  Diagnosis: Bipolar disorder, current episode mixed, moderate  Assessment and Plan of Treatment: Continue current medication regimen and follow up with aftercare appointments

## 2023-08-29 NOTE — BH DISCHARGE NOTE NURSING/SOCIAL WORK/PSYCH REHAB - NSDCPRGOAL_PSY_ALL_CORE
Pt participated increasingly in groups over her hospitalization. Pt displays brighter affect and interacts with select peers in groups and in the milieu. Pt endorses concern for psychosocial stressors but endorses increased motivation to achieve her goals and maintain her determination. Pt appears superficial at times but is responsive to more specific prompts and encouragement to reflect further. Pt endorses intention to follow medication regimen and engage in outpatient therapy. Pt appears more hopeful about the further and was receptive to resources on coping strategies and DEMI support groups. Pt completed a safety plan and received a copy to take with her.

## 2023-08-29 NOTE — BH INPATIENT PSYCHIATRY DISCHARGE NOTE - HPI (INCLUDE ILLNESS QUALITY, SEVERITY, DURATION, TIMING, CONTEXT, MODIFYING FACTORS, ASSOCIATED SIGNS AND SYMPTOMS)
This is a 53y/o female unmarried, unemployed, undomiciled-street homeless for 5 months. PMH significant for PCOS, DM II, and HTN. Psych history significant for reported Bipolar disorder, multiple psych admissions(>20), last admitted to [facility] from 6/29-7/27/20. Previous hospitalization at [facility] in 2016. Patient denies hx of SA/NSSIB, denies h/o aggression, denies active substance use. +legal hx(reports having been arrested twice for trespassing). Reports that she is in outpt care with Dr. Novak at [facility] prescribed Quetiapine 400 mg BID and Trileptal 600 mg BID which she has been non-compliant with. Patient self presents to the ED with complaints of si/hi, worsening mood and irritability in context of medication non-compliance x1 month. Seeking voluntary admission. Patient transferred to [facility] voluntary status.     Patient states that she got into an argument with her sister 5 months ago. Her boyfriend of 14 years also got into an argument with his family at the same time and they have both been homeless together since then. She stopped taking her metformin and norvasc at that time as she did not follow up with her PCP. She states that she hasn't been taking her medications correctly due to at times not having the medications on her person or not wanting to sleep too deeply after taking seroquel. She states that she and her partner have been going to drop in shelters occasionally and have been wanting to a get a couple's room, however her partner doesn't have ID. They are hopeful that they will be able to get a domestic partnership certificate. She reports being very irritable, depressed and having racing thoughts. Additionally she had been having thoughts of 'wanting to hurt myself and others.' No plan or intent reported. No psychotic sxs, no avh or PI.     She gets her medications from her psychiatrist at [facility] and seems him every 2-3 months, however he provides refills for her monthly. Seroquel 400mg bid, trileptal 600mg bid, metformin 1000mg bid. Sleep and appetite overall unremarkable.   She does not give permission for treatment team to attain collateral. Also does not share that her partner has been admitted to another hospital for psych.    Per Ed report:  BTCM called pt's mother-in-law, who states she is unable to provide additional collateral and provided contact info for pt's vikas. [facility] left VM for pt's vikas requesting he call back for collateral.   On reevaluation at 10am, pt reports that vikas also has a history of bipolar disorder, was admitted to a psychiatric facility in [place] a few days ago. Patient no longer has any social support.  Reached Dr. Novak at [facility] - reports that patient was last seen at the [facility] in March 2023, failed to attend appointments or answer calls/letters since then. Clinic closed patient's case and discharged her in July, no longer considers her a patient and not taking new patients at this time, unable to schedule her for outpatient care. Provider endorses patient's long history of bipolar disorder with symptoms of irritability, reports that she was doing moderately well on quetiapine 400 mg BID and trileptal 600 mg BID when she was adherent to her medications.

## 2023-08-30 NOTE — BH SOCIAL WORK CONFIRMATION FOLLOW UP NOTE - NSCOMMENTS_PSY_ALL_CORE
Caring Call 8/30/23: Pt reports doing well. Denies SI/HI AH/VH. Pt reports doing well, is future oriented and plans to get domestic partnership soon.     Linkage call: 8/30/23: PT attended appointment as confirmed by patient. Has next appointment on 9/5/23.

## 2023-09-05 DIAGNOSIS — E11.9 TYPE 2 DIABETES MELLITUS WITHOUT COMPLICATIONS: ICD-10-CM

## 2023-09-05 DIAGNOSIS — R45.851 SUICIDAL IDEATIONS: ICD-10-CM

## 2023-09-05 DIAGNOSIS — F31.9 BIPOLAR DISORDER, UNSPECIFIED: ICD-10-CM

## 2023-09-05 DIAGNOSIS — F31.62 BIPOLAR DISORDER, CURRENT EPISODE MIXED, MODERATE: ICD-10-CM

## 2023-09-05 DIAGNOSIS — Z79.84 LONG TERM (CURRENT) USE OF ORAL HYPOGLYCEMIC DRUGS: ICD-10-CM

## 2023-09-05 DIAGNOSIS — Z76.5 MALINGERER [CONSCIOUS SIMULATION]: ICD-10-CM

## 2023-09-05 DIAGNOSIS — I10 ESSENTIAL (PRIMARY) HYPERTENSION: ICD-10-CM

## 2024-07-02 NOTE — ED ADULT NURSE NOTE - BEHAVIOR
Department of Anesthesiology  Preprocedure Note       Name:  Reji Piper   Age:  64 y.o.  :  1959                                          MRN:  [id number]         Date:  2024      Surgeon: Surgeon(s):  Chilo Jimenez MD    Procedure: Procedure(s):  COLONOSCOPY DIAGNOSTIC    Medications prior to admission:   Prior to Admission medications    Medication Sig Start Date End Date Taking? Authorizing Provider   Lysine 500 MG CAPS Take by mouth   Yes Provider, MD Azalia       Current medications:    Current Facility-Administered Medications   Medication Dose Route Frequency Provider Last Rate Last Admin    0.9 % sodium chloride infusion   IntraVENous Continuous Royer Costello  mL/hr at 24 1302 New Bag at 24 1302       Allergies:    Allergies   Allergen Reactions    Codeine Headaches       Problem List:  There is no problem list on file for this patient.      Past Medical History:  History reviewed. No pertinent past medical history.    Past Surgical History:        Procedure Laterality Date    COLONOSCOPY      EYE SURGERY         Social History:    Social History     Tobacco Use    Smoking status: Never    Smokeless tobacco: Never   Substance Use Topics    Alcohol use: Never                                Counseling given: Not Answered      Vital Signs (Current):   Vitals:    24 1248   BP: 111/71   Pulse: 75   Resp: (!) 118   Temp: 97.2 °F (36.2 °C)   TempSrc: Temporal   SpO2: 100%   Weight: 71.2 kg (157 lb)   Height: 1.765 m (5' 9.5\")                                              BP Readings from Last 3 Encounters:   24 111/71       NPO Status: Time of last liquid consumption: 630                        Time of last solid consumption: 1700 (clear liquids yesterday)                        Date of last liquid consumption: 24                        Date of last solid food consumption: 24    BMI:   Wt Readings from Last 3 Encounters:   24 71.2 
Cooperative
